# Patient Record
Sex: MALE | Race: WHITE | NOT HISPANIC OR LATINO | Employment: OTHER | URBAN - METROPOLITAN AREA
[De-identification: names, ages, dates, MRNs, and addresses within clinical notes are randomized per-mention and may not be internally consistent; named-entity substitution may affect disease eponyms.]

---

## 2017-02-24 ENCOUNTER — HOSPITAL ENCOUNTER (OUTPATIENT)
Dept: LAB | Facility: MEDICAL CENTER | Age: 77
End: 2017-02-24
Attending: INTERNAL MEDICINE
Payer: MEDICARE

## 2017-02-24 LAB
ALBUMIN SERPL BCP-MCNC: 3.7 G/DL (ref 3.2–4.9)
ALBUMIN/GLOB SERPL: 1.5 G/DL
ALP SERPL-CCNC: 84 U/L (ref 30–99)
ALT SERPL-CCNC: 22 U/L (ref 2–50)
ANION GAP SERPL CALC-SCNC: 10 MMOL/L (ref 0–11.9)
AST SERPL-CCNC: 18 U/L (ref 12–45)
BILIRUB SERPL-MCNC: 1 MG/DL (ref 0.1–1.5)
BUN SERPL-MCNC: 23 MG/DL (ref 8–22)
CALCIUM SERPL-MCNC: 8.6 MG/DL (ref 8.5–10.5)
CHLORIDE SERPL-SCNC: 106 MMOL/L (ref 96–112)
CO2 SERPL-SCNC: 24 MMOL/L (ref 20–33)
CREAT SERPL-MCNC: 1.2 MG/DL (ref 0.5–1.4)
GLOBULIN SER CALC-MCNC: 2.5 G/DL (ref 1.9–3.5)
GLUCOSE SERPL-MCNC: 135 MG/DL (ref 65–99)
POTASSIUM SERPL-SCNC: 3.5 MMOL/L (ref 3.6–5.5)
PROT SERPL-MCNC: 6.2 G/DL (ref 6–8.2)
SODIUM SERPL-SCNC: 140 MMOL/L (ref 135–145)

## 2017-02-24 PROCEDURE — 36415 COLL VENOUS BLD VENIPUNCTURE: CPT

## 2017-02-24 PROCEDURE — 80053 COMPREHEN METABOLIC PANEL: CPT

## 2017-03-02 ENCOUNTER — HOSPITAL ENCOUNTER (OUTPATIENT)
Dept: RADIOLOGY | Facility: MEDICAL CENTER | Age: 77
End: 2017-03-02
Attending: INTERNAL MEDICINE
Payer: MEDICARE

## 2017-03-02 DIAGNOSIS — C34.32 PRIMARY MALIGNANT NEOPLASM OF BRONCHUS OF LEFT LOWER LOBE (HCC): ICD-10-CM

## 2017-03-02 PROCEDURE — 71260 CT THORAX DX C+: CPT

## 2017-03-02 PROCEDURE — 700117 HCHG RX CONTRAST REV CODE 255: Performed by: INTERNAL MEDICINE

## 2017-03-02 RX ADMIN — IOHEXOL 100 ML: 350 INJECTION, SOLUTION INTRAVENOUS at 11:15

## 2017-03-23 ENCOUNTER — HOSPITAL ENCOUNTER (OUTPATIENT)
Dept: RADIOLOGY | Facility: MEDICAL CENTER | Age: 77
End: 2017-03-23
Attending: RADIOLOGY
Payer: MEDICARE

## 2017-03-23 ENCOUNTER — HOSPITAL ENCOUNTER (OUTPATIENT)
Dept: RADIATION ONCOLOGY | Facility: MEDICAL CENTER | Age: 77
End: 2017-03-31
Attending: RADIOLOGY
Payer: MEDICARE

## 2017-03-23 VITALS
BODY MASS INDEX: 39.24 KG/M2 | TEMPERATURE: 96.8 F | WEIGHT: 243 LBS | SYSTOLIC BLOOD PRESSURE: 124 MMHG | DIASTOLIC BLOOD PRESSURE: 62 MMHG | HEART RATE: 109 BPM | OXYGEN SATURATION: 92 %

## 2017-03-23 DIAGNOSIS — C79.31 BRAIN METASTASIS: ICD-10-CM

## 2017-03-23 PROCEDURE — 99212 OFFICE O/P EST SF 10 MIN: CPT | Performed by: RADIOLOGY

## 2017-03-23 PROCEDURE — 99214 OFFICE O/P EST MOD 30 MIN: CPT | Performed by: RADIOLOGY

## 2017-03-23 PROCEDURE — A9577 INJ MULTIHANCE: HCPCS | Performed by: RADIOLOGY

## 2017-03-23 PROCEDURE — 700117 HCHG RX CONTRAST REV CODE 255: Performed by: RADIOLOGY

## 2017-03-23 PROCEDURE — 70553 MRI BRAIN STEM W/O & W/DYE: CPT

## 2017-03-23 RX ORDER — DEXAMETHASONE 2 MG/1
2 TABLET ORAL
Qty: 100 TAB | Refills: 0 | Status: SHIPPED | OUTPATIENT
Start: 2017-03-23 | End: 2017-04-02

## 2017-03-23 RX ADMIN — GADOBENATE DIMEGLUMINE 10 ML: 529 INJECTION, SOLUTION INTRAVENOUS at 12:15
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
## 2017-03-30 ENCOUNTER — HOSPITAL ENCOUNTER (OUTPATIENT)
Dept: RADIATION ONCOLOGY | Facility: MEDICAL CENTER | Age: 77
End: 2017-03-30

## 2017-03-30 PROCEDURE — 77334 RADIATION TREATMENT AID(S): CPT | Mod: 26 | Performed by: RADIOLOGY

## 2017-03-30 PROCEDURE — 77290 THER RAD SIMULAJ FIELD CPLX: CPT | Performed by: RADIOLOGY

## 2017-03-30 PROCEDURE — 77334 RADIATION TREATMENT AID(S): CPT | Performed by: RADIOLOGY

## 2017-03-30 PROCEDURE — 77263 THER RADIOLOGY TX PLNG CPLX: CPT | Performed by: RADIOLOGY

## 2017-03-30 PROCEDURE — 77470 SPECIAL RADIATION TREATMENT: CPT | Performed by: RADIOLOGY

## 2017-03-30 PROCEDURE — 77470 SPECIAL RADIATION TREATMENT: CPT | Mod: 26 | Performed by: RADIOLOGY

## 2017-04-11 ENCOUNTER — TELEPHONE (OUTPATIENT)
Dept: RADIATION ONCOLOGY | Facility: MEDICAL CENTER | Age: 77
End: 2017-04-11

## 2017-04-11 DIAGNOSIS — C79.31 BRAIN METASTASIS: ICD-10-CM

## 2017-04-11 RX ORDER — MEMANTINE HYDROCHLORIDE 10 MG/1
10 TABLET ORAL 2 TIMES DAILY
Qty: 60 TAB | Refills: 5 | Status: SHIPPED | OUTPATIENT
Start: 2017-04-11

## 2017-04-11 NOTE — TELEPHONE ENCOUNTER
Radiation Oncology:    77-year-old with stage IV lung cancer. Previously treated with stereotactic radiosurgery to several brain sites. Now has additional brain metastasis and has been recommended whole brain radiotherapy. Initially, we were attempting to enroll him in a clinical trial evaluating the use of Namenda and hippocampal sparing radiotherapy.    Unfortunately because of patient's prior radiosurgical treatments he would not be a candidate for clinical trial. However, since he will require IMRT based radiotherapy to treat his brain we can proceed on with hippocampal sparing off trial as well as the use of Namenda which is previously been established to help minimize decrease in cognitive function associated with whole brain radiotherapy.    Patient's currently on weekly Navelbine, and received a dose on April 10. We'll plan on starting radiotherapy on April 17 with completion on April 28. Discussed with Dr. Gomez holding now will be on the 17th and 24th with resumption on May 1. Patient will also start Namenda today. We'll continue during radiation and for 6 months post radiation.    Justus KWOK M.D.  Electronically signed by: Justus Rucker V, 4/11/2017 10:18 AM  [phone]

## 2017-04-13 PROCEDURE — 77338 DESIGN MLC DEVICE FOR IMRT: CPT | Mod: 26 | Performed by: RADIOLOGY

## 2017-04-13 PROCEDURE — 77300 RADIATION THERAPY DOSE PLAN: CPT | Performed by: RADIOLOGY

## 2017-04-13 PROCEDURE — 77300 RADIATION THERAPY DOSE PLAN: CPT | Mod: 26 | Performed by: RADIOLOGY

## 2017-04-13 PROCEDURE — 77301 RADIOTHERAPY DOSE PLAN IMRT: CPT | Performed by: RADIOLOGY

## 2017-04-13 PROCEDURE — 77338 DESIGN MLC DEVICE FOR IMRT: CPT | Performed by: RADIOLOGY

## 2017-04-13 PROCEDURE — 77301 RADIOTHERAPY DOSE PLAN IMRT: CPT | Mod: 26 | Performed by: RADIOLOGY

## 2017-04-14 PROCEDURE — 77370 RADIATION PHYSICS CONSULT: CPT | Performed by: RADIOLOGY

## 2017-04-17 ENCOUNTER — HOSPITAL ENCOUNTER (OUTPATIENT)
Dept: RADIATION ONCOLOGY | Facility: MEDICAL CENTER | Age: 77
End: 2017-04-17

## 2017-04-17 ENCOUNTER — HOSPITAL ENCOUNTER (OUTPATIENT)
Dept: RADIATION ONCOLOGY | Facility: MEDICAL CENTER | Age: 77
End: 2017-04-30
Attending: RADIOLOGY
Payer: MEDICARE

## 2017-04-17 PROCEDURE — 77280 THER RAD SIMULAJ FIELD SMPL: CPT | Performed by: RADIOLOGY

## 2017-04-17 PROCEDURE — 77386 HCHG IMRT DELIVERY COMPLEX: CPT | Performed by: RADIOLOGY

## 2017-04-17 PROCEDURE — 77280 THER RAD SIMULAJ FIELD SMPL: CPT | Mod: 26 | Performed by: RADIOLOGY

## 2017-04-18 PROCEDURE — 77386 HCHG IMRT DELIVERY COMPLEX: CPT | Performed by: RADIOLOGY

## 2017-04-18 PROCEDURE — 77014 PR CT GUIDANCE PLACEMENT RAD THERAPY FIELDS: CPT | Mod: 26 | Performed by: RADIOLOGY

## 2017-04-19 PROCEDURE — 77386 HCHG IMRT DELIVERY COMPLEX: CPT | Performed by: RADIOLOGY

## 2017-04-19 PROCEDURE — 77014 PR CT GUIDANCE PLACEMENT RAD THERAPY FIELDS: CPT | Mod: 26 | Performed by: RADIOLOGY

## 2017-04-19 PROCEDURE — 77336 RADIATION PHYSICS CONSULT: CPT | Performed by: RADIOLOGY

## 2017-04-20 PROCEDURE — 77014 PR CT GUIDANCE PLACEMENT RAD THERAPY FIELDS: CPT | Mod: 26 | Performed by: RADIOLOGY

## 2017-04-20 PROCEDURE — 77386 HCHG IMRT DELIVERY COMPLEX: CPT | Performed by: RADIOLOGY

## 2017-04-21 PROCEDURE — 77386 HCHG IMRT DELIVERY COMPLEX: CPT | Performed by: RADIOLOGY

## 2017-04-21 PROCEDURE — 77427 RADIATION TX MANAGEMENT X5: CPT | Performed by: RADIOLOGY

## 2017-04-21 PROCEDURE — 77014 PR CT GUIDANCE PLACEMENT RAD THERAPY FIELDS: CPT | Mod: 26 | Performed by: RADIOLOGY

## 2017-04-24 PROCEDURE — 77386 HCHG IMRT DELIVERY COMPLEX: CPT | Performed by: RADIOLOGY

## 2017-04-24 PROCEDURE — 77014 PR CT GUIDANCE PLACEMENT RAD THERAPY FIELDS: CPT | Mod: 26 | Performed by: RADIOLOGY

## 2017-04-25 PROCEDURE — 77386 HCHG IMRT DELIVERY COMPLEX: CPT | Performed by: RADIOLOGY

## 2017-04-25 PROCEDURE — 77014 PR CT GUIDANCE PLACEMENT RAD THERAPY FIELDS: CPT | Mod: 26 | Performed by: RADIOLOGY

## 2017-04-26 ENCOUNTER — HOSPITAL ENCOUNTER (OUTPATIENT)
Dept: RADIOLOGY | Facility: MEDICAL CENTER | Age: 77
End: 2017-04-26
Attending: INTERNAL MEDICINE
Payer: MEDICARE

## 2017-04-26 DIAGNOSIS — C34.32 PRIMARY MALIGNANT NEOPLASM OF BRONCHUS OF LEFT LOWER LOBE (HCC): ICD-10-CM

## 2017-04-26 DIAGNOSIS — C79.31 BRAIN METASTASIS: ICD-10-CM

## 2017-04-26 DIAGNOSIS — C79.31 SECONDARY MALIGNANT NEOPLASM OF BRAIN AND SPINAL CORD (HCC): ICD-10-CM

## 2017-04-26 DIAGNOSIS — C79.49 SECONDARY MALIGNANT NEOPLASM OF BRAIN AND SPINAL CORD (HCC): ICD-10-CM

## 2017-04-26 PROCEDURE — 77386 HCHG IMRT DELIVERY COMPLEX: CPT | Performed by: RADIOLOGY

## 2017-04-26 PROCEDURE — 77336 RADIATION PHYSICS CONSULT: CPT | Performed by: RADIOLOGY

## 2017-04-26 PROCEDURE — 71260 CT THORAX DX C+: CPT

## 2017-04-26 PROCEDURE — 77014 PR CT GUIDANCE PLACEMENT RAD THERAPY FIELDS: CPT | Mod: 26 | Performed by: RADIOLOGY

## 2017-04-26 PROCEDURE — 700117 HCHG RX CONTRAST REV CODE 255: Performed by: INTERNAL MEDICINE

## 2017-04-26 RX ADMIN — IOHEXOL 100 ML: 350 INJECTION, SOLUTION INTRAVENOUS at 15:00

## 2017-04-27 PROCEDURE — 77386 HCHG IMRT DELIVERY COMPLEX: CPT | Performed by: RADIOLOGY

## 2017-04-27 PROCEDURE — 77014 PR CT GUIDANCE PLACEMENT RAD THERAPY FIELDS: CPT | Mod: 26 | Performed by: RADIOLOGY

## 2017-04-28 PROCEDURE — 77386 HCHG IMRT DELIVERY COMPLEX: CPT | Performed by: RADIOLOGY

## 2017-04-28 PROCEDURE — 77427 RADIATION TX MANAGEMENT X5: CPT | Performed by: RADIOLOGY

## 2017-04-28 PROCEDURE — 77014 PR CT GUIDANCE PLACEMENT RAD THERAPY FIELDS: CPT | Mod: 26 | Performed by: RADIOLOGY

## 2017-05-04 ENCOUNTER — APPOINTMENT (OUTPATIENT)
Dept: RADIOLOGY | Facility: MEDICAL CENTER | Age: 77
DRG: 205 | End: 2017-05-04
Attending: EMERGENCY MEDICINE
Payer: MEDICARE

## 2017-05-04 ENCOUNTER — HOSPITAL ENCOUNTER (INPATIENT)
Facility: MEDICAL CENTER | Age: 77
LOS: 5 days | DRG: 205 | End: 2017-05-09
Attending: EMERGENCY MEDICINE | Admitting: INTERNAL MEDICINE
Payer: MEDICARE

## 2017-05-04 ENCOUNTER — RESOLUTE PROFESSIONAL BILLING HOSPITAL PROF FEE (OUTPATIENT)
Dept: HOSPITALIST | Facility: MEDICAL CENTER | Age: 77
End: 2017-05-04
Payer: MEDICARE

## 2017-05-04 DIAGNOSIS — J96.01 ACUTE RESPIRATORY FAILURE WITH HYPOXIA (HCC): ICD-10-CM

## 2017-05-04 PROBLEM — D64.9 NORMOCYTIC ANEMIA: Status: ACTIVE | Noted: 2017-05-04

## 2017-05-04 PROBLEM — D69.6 THROMBOCYTOPENIA (HCC): Status: ACTIVE | Noted: 2017-05-04

## 2017-05-04 PROBLEM — R73.9 HYPERGLYCEMIA: Status: ACTIVE | Noted: 2017-05-04

## 2017-05-04 PROBLEM — R79.89 ELEVATED D-DIMER: Status: ACTIVE | Noted: 2017-05-04

## 2017-05-04 PROBLEM — E87.20 LACTIC ACIDOSIS: Status: ACTIVE | Noted: 2017-05-04

## 2017-05-04 LAB
ALBUMIN SERPL BCP-MCNC: 3.5 G/DL (ref 3.2–4.9)
ALBUMIN/GLOB SERPL: 1.4 G/DL
ALP SERPL-CCNC: 76 U/L (ref 30–99)
ALT SERPL-CCNC: 37 U/L (ref 2–50)
ANION GAP SERPL CALC-SCNC: 10 MMOL/L (ref 0–11.9)
ANISOCYTOSIS BLD QL SMEAR: ABNORMAL
APPEARANCE UR: CLEAR
AST SERPL-CCNC: 17 U/L (ref 12–45)
BASOPHILS # BLD AUTO: 0 % (ref 0–1.8)
BASOPHILS # BLD: 0 K/UL (ref 0–0.12)
BILIRUB SERPL-MCNC: 1.1 MG/DL (ref 0.1–1.5)
BILIRUB UR QL STRIP.AUTO: NEGATIVE
BNP SERPL-MCNC: 33 PG/ML (ref 0–100)
BUN SERPL-MCNC: 30 MG/DL (ref 8–22)
CALCIUM SERPL-MCNC: 8.8 MG/DL (ref 8.5–10.5)
CHLORIDE SERPL-SCNC: 103 MMOL/L (ref 96–112)
CO2 SERPL-SCNC: 22 MMOL/L (ref 20–33)
COLOR UR: YELLOW
CREAT SERPL-MCNC: 0.86 MG/DL (ref 0.5–1.4)
DEPRECATED D DIMER PPP IA-ACNC: 348 NG/ML(D-DU)
EOSINOPHIL # BLD AUTO: 0 K/UL (ref 0–0.51)
EOSINOPHIL NFR BLD: 0 % (ref 0–6.9)
ERYTHROCYTE [DISTWIDTH] IN BLOOD BY AUTOMATED COUNT: 71.7 FL (ref 35.9–50)
GFR SERPL CREATININE-BSD FRML MDRD: >60 ML/MIN/1.73 M 2
GLOBULIN SER CALC-MCNC: 2.5 G/DL (ref 1.9–3.5)
GLUCOSE BLD-MCNC: 116 MG/DL (ref 65–99)
GLUCOSE SERPL-MCNC: 198 MG/DL (ref 65–99)
GLUCOSE UR STRIP.AUTO-MCNC: NEGATIVE MG/DL
HCT VFR BLD AUTO: 41.3 % (ref 42–52)
HGB BLD-MCNC: 13.6 G/DL (ref 14–18)
KETONES UR STRIP.AUTO-MCNC: NEGATIVE MG/DL
LACTATE BLD-SCNC: 1.4 MMOL/L (ref 0.5–2)
LACTATE BLD-SCNC: 1.8 MMOL/L (ref 0.5–2)
LACTATE BLD-SCNC: 2.5 MMOL/L (ref 0.5–2)
LEUKOCYTE ESTERASE UR QL STRIP.AUTO: NEGATIVE
LYMPHOCYTES # BLD AUTO: 0.31 K/UL (ref 1–4.8)
LYMPHOCYTES NFR BLD: 4.4 % (ref 22–41)
MANUAL DIFF BLD: NORMAL
MCH RBC QN AUTO: 30.6 PG (ref 27–33)
MCHC RBC AUTO-ENTMCNC: 32.9 G/DL (ref 33.7–35.3)
MCV RBC AUTO: 92.8 FL (ref 81.4–97.8)
MICRO URNS: NORMAL
MICROCYTES BLD QL SMEAR: ABNORMAL
MONOCYTES # BLD AUTO: 0 K/UL (ref 0–0.85)
MONOCYTES NFR BLD AUTO: 0 % (ref 0–13.4)
MORPHOLOGY BLD-IMP: NORMAL
NEUTROPHILS # BLD AUTO: 6.69 K/UL (ref 1.82–7.42)
NEUTROPHILS NFR BLD: 95.6 % (ref 44–72)
NITRITE UR QL STRIP.AUTO: NEGATIVE
NRBC # BLD AUTO: 0.02 K/UL
NRBC BLD AUTO-RTO: 0.3 /100 WBC
PH UR STRIP.AUTO: 6 [PH]
PLATELET # BLD AUTO: 123 K/UL (ref 164–446)
PLATELET BLD QL SMEAR: NORMAL
PMV BLD AUTO: 11.2 FL (ref 9–12.9)
POIKILOCYTOSIS BLD QL SMEAR: NORMAL
POTASSIUM SERPL-SCNC: 4.1 MMOL/L (ref 3.6–5.5)
PROT SERPL-MCNC: 6 G/DL (ref 6–8.2)
PROT UR QL STRIP: NEGATIVE MG/DL
RBC # BLD AUTO: 4.45 M/UL (ref 4.7–6.1)
RBC BLD AUTO: PRESENT
RBC UR QL AUTO: NEGATIVE
SODIUM SERPL-SCNC: 135 MMOL/L (ref 135–145)
SP GR UR STRIP.AUTO: 1.02
WBC # BLD AUTO: 7 K/UL (ref 4.8–10.8)

## 2017-05-04 PROCEDURE — 700102 HCHG RX REV CODE 250 W/ 637 OVERRIDE(OP): Performed by: INTERNAL MEDICINE

## 2017-05-04 PROCEDURE — 81003 URINALYSIS AUTO W/O SCOPE: CPT

## 2017-05-04 PROCEDURE — 93005 ELECTROCARDIOGRAM TRACING: CPT | Performed by: EMERGENCY MEDICINE

## 2017-05-04 PROCEDURE — 700105 HCHG RX REV CODE 258: Performed by: EMERGENCY MEDICINE

## 2017-05-04 PROCEDURE — 306580 SET INFUSION,POWERLOC 20G X.75

## 2017-05-04 PROCEDURE — 700111 HCHG RX REV CODE 636 W/ 250 OVERRIDE (IP): Performed by: INTERNAL MEDICINE

## 2017-05-04 PROCEDURE — 87086 URINE CULTURE/COLONY COUNT: CPT

## 2017-05-04 PROCEDURE — 85027 COMPLETE CBC AUTOMATED: CPT

## 2017-05-04 PROCEDURE — 700101 HCHG RX REV CODE 250: Performed by: EMERGENCY MEDICINE

## 2017-05-04 PROCEDURE — 770020 HCHG ROOM/CARE - TELE (206)

## 2017-05-04 PROCEDURE — 70450 CT HEAD/BRAIN W/O DYE: CPT

## 2017-05-04 PROCEDURE — 94640 AIRWAY INHALATION TREATMENT: CPT

## 2017-05-04 PROCEDURE — 71275 CT ANGIOGRAPHY CHEST: CPT

## 2017-05-04 PROCEDURE — 87040 BLOOD CULTURE FOR BACTERIA: CPT

## 2017-05-04 PROCEDURE — 80053 COMPREHEN METABOLIC PANEL: CPT

## 2017-05-04 PROCEDURE — 83605 ASSAY OF LACTIC ACID: CPT | Mod: 91

## 2017-05-04 PROCEDURE — 85007 BL SMEAR W/DIFF WBC COUNT: CPT

## 2017-05-04 PROCEDURE — 83036 HEMOGLOBIN GLYCOSYLATED A1C: CPT

## 2017-05-04 PROCEDURE — 82962 GLUCOSE BLOOD TEST: CPT

## 2017-05-04 PROCEDURE — 83880 ASSAY OF NATRIURETIC PEPTIDE: CPT

## 2017-05-04 PROCEDURE — 85379 FIBRIN DEGRADATION QUANT: CPT

## 2017-05-04 PROCEDURE — A9270 NON-COVERED ITEM OR SERVICE: HCPCS | Performed by: INTERNAL MEDICINE

## 2017-05-04 PROCEDURE — 99223 1ST HOSP IP/OBS HIGH 75: CPT | Mod: AI | Performed by: INTERNAL MEDICINE

## 2017-05-04 PROCEDURE — 700105 HCHG RX REV CODE 258: Performed by: INTERNAL MEDICINE

## 2017-05-04 PROCEDURE — 36415 COLL VENOUS BLD VENIPUNCTURE: CPT

## 2017-05-04 PROCEDURE — 99285 EMERGENCY DEPT VISIT HI MDM: CPT

## 2017-05-04 PROCEDURE — 700117 HCHG RX CONTRAST REV CODE 255: Performed by: EMERGENCY MEDICINE

## 2017-05-04 PROCEDURE — 96360 HYDRATION IV INFUSION INIT: CPT

## 2017-05-04 PROCEDURE — 71010 DX-CHEST-PORTABLE (1 VIEW): CPT

## 2017-05-04 RX ORDER — DEXAMETHASONE 1 MG
2 TABLET ORAL DAILY
Status: DISCONTINUED | OUTPATIENT
Start: 2017-05-04 | End: 2017-05-09 | Stop reason: HOSPADM

## 2017-05-04 RX ORDER — GUAIFENESIN/DEXTROMETHORPHAN 100-10MG/5
10 SYRUP ORAL EVERY 6 HOURS PRN
Status: DISCONTINUED | OUTPATIENT
Start: 2017-05-04 | End: 2017-05-09 | Stop reason: HOSPADM

## 2017-05-04 RX ORDER — DEXAMETHASONE 2 MG/1
2 TABLET ORAL DAILY
COMMUNITY
End: 2017-06-20

## 2017-05-04 RX ORDER — SIMVASTATIN 20 MG
20 TABLET ORAL NIGHTLY
Status: DISCONTINUED | OUTPATIENT
Start: 2017-05-04 | End: 2017-05-09 | Stop reason: HOSPADM

## 2017-05-04 RX ORDER — ONDANSETRON 4 MG/1
4 TABLET, ORALLY DISINTEGRATING ORAL EVERY 4 HOURS PRN
Status: DISCONTINUED | OUTPATIENT
Start: 2017-05-04 | End: 2017-05-09 | Stop reason: HOSPADM

## 2017-05-04 RX ORDER — SODIUM CHLORIDE 9 MG/ML
INJECTION, SOLUTION INTRAVENOUS CONTINUOUS
Status: DISCONTINUED | OUTPATIENT
Start: 2017-05-04 | End: 2017-05-09 | Stop reason: HOSPADM

## 2017-05-04 RX ORDER — ONDANSETRON 2 MG/ML
4 INJECTION INTRAMUSCULAR; INTRAVENOUS EVERY 4 HOURS PRN
Status: DISCONTINUED | OUTPATIENT
Start: 2017-05-04 | End: 2017-05-09 | Stop reason: HOSPADM

## 2017-05-04 RX ORDER — POLYETHYLENE GLYCOL 3350 17 G/17G
1 POWDER, FOR SOLUTION ORAL
Status: DISCONTINUED | OUTPATIENT
Start: 2017-05-04 | End: 2017-05-09 | Stop reason: HOSPADM

## 2017-05-04 RX ORDER — DEXTROSE MONOHYDRATE 25 G/50ML
25 INJECTION, SOLUTION INTRAVENOUS
Status: DISCONTINUED | OUTPATIENT
Start: 2017-05-04 | End: 2017-05-09 | Stop reason: HOSPADM

## 2017-05-04 RX ORDER — AMOXICILLIN 250 MG
2 CAPSULE ORAL 2 TIMES DAILY
Status: DISCONTINUED | OUTPATIENT
Start: 2017-05-05 | End: 2017-05-09 | Stop reason: HOSPADM

## 2017-05-04 RX ORDER — METOPROLOL TARTRATE 50 MG/1
50 TABLET, FILM COATED ORAL 2 TIMES DAILY
Status: DISCONTINUED | OUTPATIENT
Start: 2017-05-04 | End: 2017-05-09 | Stop reason: HOSPADM

## 2017-05-04 RX ORDER — LEVOTHYROXINE SODIUM 0.03 MG/1
25 TABLET ORAL
Status: DISCONTINUED | OUTPATIENT
Start: 2017-05-05 | End: 2017-05-09 | Stop reason: HOSPADM

## 2017-05-04 RX ORDER — MEMANTINE HYDROCHLORIDE 10 MG/1
10 TABLET ORAL 2 TIMES DAILY
Status: DISCONTINUED | OUTPATIENT
Start: 2017-05-04 | End: 2017-05-09 | Stop reason: HOSPADM

## 2017-05-04 RX ORDER — ALBUTEROL SULFATE 90 UG/1
2 AEROSOL, METERED RESPIRATORY (INHALATION)
Status: DISCONTINUED | OUTPATIENT
Start: 2017-05-04 | End: 2017-05-08

## 2017-05-04 RX ORDER — BISACODYL 10 MG
10 SUPPOSITORY, RECTAL RECTAL
Status: DISCONTINUED | OUTPATIENT
Start: 2017-05-04 | End: 2017-05-09 | Stop reason: HOSPADM

## 2017-05-04 RX ORDER — SODIUM CHLORIDE 9 MG/ML
1000 INJECTION, SOLUTION INTRAVENOUS ONCE
Status: COMPLETED | OUTPATIENT
Start: 2017-05-04 | End: 2017-05-04

## 2017-05-04 RX ADMIN — ALBUTEROL SULFATE 2.5 MG: 2.5 SOLUTION RESPIRATORY (INHALATION) at 19:31

## 2017-05-04 RX ADMIN — METOPROLOL TARTRATE 50 MG: 50 TABLET, FILM COATED ORAL at 22:21

## 2017-05-04 RX ADMIN — SODIUM CHLORIDE: 9 INJECTION, SOLUTION INTRAVENOUS at 22:16

## 2017-05-04 RX ADMIN — MEMANTINE HYDROCHLORIDE 10 MG: 10 TABLET ORAL at 22:21

## 2017-05-04 RX ADMIN — SIMVASTATIN 20 MG: 20 TABLET, FILM COATED ORAL at 22:21

## 2017-05-04 RX ADMIN — IPRATROPIUM BROMIDE 0.5 MG: 0.5 SOLUTION RESPIRATORY (INHALATION) at 19:31

## 2017-05-04 RX ADMIN — IOHEXOL 75 ML: 350 INJECTION, SOLUTION INTRAVENOUS at 20:44

## 2017-05-04 RX ADMIN — ENOXAPARIN SODIUM 40 MG: 100 INJECTION SUBCUTANEOUS at 22:22

## 2017-05-04 RX ADMIN — SODIUM CHLORIDE 1000 ML: 9 INJECTION, SOLUTION INTRAVENOUS at 16:29

## 2017-05-04 RX ADMIN — ALBUTEROL SULFATE 2 PUFF: 90 AEROSOL, METERED RESPIRATORY (INHALATION) at 23:58

## 2017-05-04 ASSESSMENT — LIFESTYLE VARIABLES
ALCOHOL_USE: NO
EVER_SMOKED: YES
EVER_SMOKED: YES

## 2017-05-04 ASSESSMENT — PATIENT HEALTH QUESTIONNAIRE - PHQ9
SUM OF ALL RESPONSES TO PHQ QUESTIONS 1-9: 0
2. FEELING DOWN, DEPRESSED, IRRITABLE, OR HOPELESS: NOT AT ALL
SUM OF ALL RESPONSES TO PHQ9 QUESTIONS 1 AND 2: 0
1. LITTLE INTEREST OR PLEASURE IN DOING THINGS: NOT AT ALL

## 2017-05-04 ASSESSMENT — COPD QUESTIONNAIRES
DURING THE PAST 4 WEEKS HOW MUCH DID YOU FEEL SHORT OF BREATH: SOME OF THE TIME
HAVE YOU SMOKED AT LEAST 100 CIGARETTES IN YOUR ENTIRE LIFE: YES
COPD SCREENING SCORE: 6
DO YOU EVER COUGH UP ANY MUCUS OR PHLEGM?: NO/ONLY WITH OCCASIONAL COLDS OR INFECTIONS

## 2017-05-04 ASSESSMENT — PAIN SCALES - GENERAL
PAINLEVEL_OUTOF10: 0
PAINLEVEL_OUTOF10: 0

## 2017-05-04 NOTE — IP AVS SNAPSHOT
5/9/2017    Sonny Carter  [address] [place] [address]    Dear Sonny:    [facility] wants to ensure your discharge home is safe and you or your loved ones have had all of your questions answered regarding your care after you leave the hospital.    Below is a list of resources and contact information should you have any questions regarding your hospital stay, follow-up instructions, or active medical symptoms.    Questions or Concerns Regarding… Contact   Medical Questions Related to Your Discharge  (7 days a week, 8am-5pm) Contact a Nurse Care Coordinator   [phone]   Medical Questions Not Related to Your Discharge  (24 hours a day / 7 days a week)  Contact the Nurse Health Line   [phone]    Medications or Discharge Instructions Refer to your discharge packet   or contact your [facility] Primary Care Provider   [phone]   Follow-up Appointment(s) Schedule your appointment via [employer]   or contact Scheduling [phone]   Billing Review your statement via [employer]  or contact Billing [phone]   Medical Records Review your records via Sitefly   or contact Medical Records [phone]     You may receive a telephone call within two days of discharge. This call is to make certain you understand your discharge instructions and have the opportunity to have any questions answered. You can also easily access your medical information, test results and upcoming appointments via the Sitefly free online health management tool. You can learn more and sign up at [employer]/Sitefly. For assistance setting up your Sitefly account, please call [phone].    Once again, we want to ensure your discharge home is safe and that you have a clear understanding of any next steps in your care. If you have any questions or concerns, please do not hesitate to contact us, we are here for you. Thank you for choosing [facility] for your healthcare needs.    Sincerely,    Your [facility] Healthcare Team

## 2017-05-04 NOTE — IP AVS SNAPSHOT
[employer] Access Code: [email or web address]  Expires: 6/8/2017  3:05 PM    [employer]  A secure, online tool to manage your health information     [facility]’s [employer]® is a secure, online tool that connects you to your personalized health information from the privacy of your home -- day or night - making it very easy for you to manage your healthcare. Once the activation process is completed, you can even access your medical information using the [employer] dusty, which is available for free in the Apple Dusty store or Google Play store.     [employer] provides the following levels of access (as shown below):   My Chart Features   [facility] Primary Care Doctor [facility]  Specialists [facility]  Urgent  Care Non-[facility]  Primary Care  Doctor   Email your healthcare team securely and privately 24/7 X X X X   Manage appointments: schedule your next appointment; view details of past/upcoming appointments X      Request prescription refills. X      View recent personal medical records, including lab and immunizations X X X X   View health record, including health history, allergies, medications X X X X   Read reports about your outpatient visits, procedures, consult and ER notes X X X X   See your discharge summary, which is a recap of your hospital and/or ER visit that includes your diagnosis, lab results, and care plan. X X       How to register for [employer]:  1. Go to  [email or web address].  2. Click on the Sign Up Now box, which takes you to the New Member Sign Up page. You will need to provide the following information:  a. Enter your [employer] Access Code exactly as it appears at the top of this page. (You will not need to use this code after you’ve completed the sign-up process. If you do not sign up before the expiration date, you must request a new code.)   b. Enter your date of birth.   c. Enter your home email address.   d. Click Submit, and follow the next screen’s instructions.  3. Create a [employer] ID. This will be your [employer]  login ID and cannot be changed, so think of one that is secure and easy to remember.  4. Create a [employer] password. You can change your password at any time.  5. Enter your Password Reset Question and Answer. This can be used at a later time if you forget your password.   6. Enter your e-mail address. This allows you to receive e-mail notifications when new information is available in [employer].  7. Click Sign Up. You can now view your health information.    For assistance activating your [employer] account, call [phone]

## 2017-05-04 NOTE — ED NOTES
Chief Complaint   Patient presents with   • Tired     Pt reports increasing fatigue over the past week.  Pt has history of lung cancer with metastasis to the brain and liver.  Pt has been getting radiation and chemotherapy.  Pt's oncologist is MD Gomez.    Pt bib [facility] for above chief complaint.  Sepsis score 3.

## 2017-05-04 NOTE — ED NOTES
The Medication Reconciliation process has been completed by interviewing the patient who had chemo last on Monday at Dr. Gomez's office.    Allergies have been reviewed  Antibiotic use in 30 days - none    Home Pharmacy:  [facility] - [place]

## 2017-05-04 NOTE — ED NOTES
Pt would like port accessed, no kit available in ER, kit ordered.  Phlebotomist called for lab draw.

## 2017-05-04 NOTE — ED NOTES
Chief Complaint   Patient presents with   • Tired     Pt reports increasing fatigue over the past week.  Pt has history of lung cancer with metastasis to the brain and liver.  Pt has been getting radiation and chemotherapy.  Pt's oncologist is MD Gomez.    Pt bib [facility] for above chief complaint.  SpO2 86% on RA.  Sepsis score 3.

## 2017-05-04 NOTE — IP AVS SNAPSHOT
" Home Care Instructions                                                                                                                  Name:Sonny Carter  Medical Record Number:[id number]  CSN: [id number]    YOB: 1940   Age: 77 y.o.  Sex: male  HT:1.727 m (5' 8\") WT: 109.4 kg (241 lb 2.9 oz)          Admit Date: 5/4/2017     Discharge Date:   Today's Date: 5/9/2017  Attending Doctor:  Jesus Cano M.D.                  Allergies:  Review of patient's allergies indicates no known allergies.            Discharge Instructions       Discharge Instructions    Discharged to other by [facility] with escort. Discharged via wheelchair, hospital escort: Yes.  Special equipment needed: Cane, Oxygen and Wheelchair    Be sure to schedule a follow-up appointment with your primary care doctor or any specialists as instructed.     Discharge Plan:   Influenza Vaccine Indication: Patient Refuses    I understand that a diet low in cholesterol, fat, and sodium is recommended for good health. Unless I have been given specific instructions below for another diet, I accept this instruction as my diet prescription.   Other diet: Cardiac    Special Instructions: None    · Is patient discharged on Warfarin / Coumadin?   No     · Is patient Post Blood Transfusion?  No    Depression / Suicide Risk    As you are discharged from this [facility] facility, it is important to learn how to keep safe from harming yourself.    Recognize the warning signs:  · Abrupt changes in personality, positive or negative- including increase in energy   · Giving away possessions  · Change in eating patterns- significant weight changes-  positive or negative  · Change in sleeping patterns- unable to sleep or sleeping all the time   · Unwillingness or inability to communicate  · Depression  · Unusual sadness, discouragement and loneliness  · Talk of wanting to die  · Neglect of personal appearance   · Rebelliousness- reckless " behavior  · Withdrawal from people/activities they love  · Confusion- inability to concentrate     If you or a loved one observes any of these behaviors or has concerns about self-harm, here's what you can do:  · Talk about it- your feelings and reasons for harming yourself  · Remove any means that you might use to hurt yourself (examples: pills, rope, extension cords, firearm)  · Get professional help from the community (Mental Health, Substance Abuse, psychological counseling)  · Do not be alone:Call your Safe Contact- someone whom you trust who will be there for you.  · Call your local CRISIS HOTLINE 298-8447 or 922-475-4917  · Call your local Children's Mobile Crisis Response Team [place] (068) 561-3783 or www.Quantum  · Call the toll free National Suicide Prevention Hotlines   · National Suicide Prevention Lifeline 131-637-TVOM (7165)  · [facility] Line Network 800-SUICIDE (846-5358)        Your appointments     May 10, 2017  8:00 AM   Adult Draw/Collection with LAB SKILLED NURSING   LAB - [facility] (--)    [address] [facility] [place] [address]   [phone]            Jun 13, 2017  1:40 PM   New Patient with [facility] [address]    [address]  [address]  [place] [id number] [phone]           Please bring Photo ID, Insurance Cards, All Medication Bottles and copies of any legal documents (such as Living Will, Power of ) If speaking a language besides English please bring an adult . Please arrive 30 minutes prior for check in and registration. You will be receiving a confirmation call a few days before your appointment from our automated call confirmation system.            Jun 16, 2017  1:15 PM   MR HEAD 60 with [address] MRI 2   [facility] - MRI - [address]    [address]  [place] [id number] [phone]            Jun 20, 2017  3:30 PM   Follow Up with MARILEE Avery  Radiation Therapy (--)    [address] [facility] [address]   [phone]                 Discharge Medication Instructions:    Below are the medications your physician expects you to take upon discharge:    Review all your home medications and newly ordered medications with your doctor and/or pharmacist. Follow medication instructions as directed by your doctor and/or pharmacist.    Please keep your medication list with you and share with your physician.               Medication List      START taking these medications        Instructions    Morning Afternoon Evening Bedtime    albuterol 108 (90 BASE) MCG/ACT Aers inhalation aerosol   Last time this was given:  2 Puffs on 5/9/2017 10:07 AM   Next Dose Due:  2pm        Inhale 2 Puffs by mouth every 4 hours.   Dose:  2 Puff                          CONTINUE taking these medications        Instructions    Morning Afternoon Evening Bedtime    dexamethasone 2 MG tablet   Last time this was given:  2 mg on 5/9/2017 10:07 AM   Commonly known as:  DECADRON   Next Dose Due:  Tomorrow Morning          Take 2 mg by mouth every day. Decreasing dose, unsure of what the dose is today.   Dose:  2 mg                        memantine 10 MG Tabs   Last time this was given:  10 mg on 5/9/2017 10:07 AM   Commonly known as:  NAMENDA   Next Dose Due:  Tomorrow Morning          Take 1 Tab by mouth 2 times a day.   Dose:  10 mg                        metoprolol 50 MG Tabs   Last time this was given:  50 mg on 5/9/2017 10:07 AM   Commonly known as:  LOPRESSOR   Next Dose Due:  Tonight          Take 50 mg by mouth 2 times a day.   Dose:  50 mg                        SYNTHROID 25 MCG Tabs   Last time this was given:  25 mcg on 5/9/2017  6:21 AM   Generic drug:  levothyroxine   Next Dose Due:  Tomorrow Morning        Take 25 mcg by mouth Every morning on an empty stomach.   Dose:  25 mcg                             Where to Get Your Medications      Information about where to get these  medications is not yet available     ! Ask your nurse or doctor about these medications    - albuterol 108 (90 BASE) MCG/ACT Aers inhalation aerosol            Orders for after discharge     REFERRAL TO SKILLED NURSING FACILITY    Complete by:  As directed              Instructions           Diet / Nutrition:    Follow any diet instructions given to you by your doctor or the dietician, including how much salt (sodium) you are allowed each day.    If you are overweight, talk to your doctor about a weight reduction plan.    Activity:    Remain physically active following your doctor's instructions about exercise and activity.    Rest often.     Any time you become even a little tired or short of breath, SIT DOWN and rest.    Worsening Symptoms:    Report any of the following signs and symptoms to the doctor's office immediately:    *Pain of jaw, arm, or neck  *Chest pain not relieved by medication                               *Dizziness or loss of consciousness  *Difficulty breathing even when at rest   *More tired than usual                                       *Bleeding drainage or swelling of surgical site  *Swelling of feet, ankles, legs or stomach                 *Fever (>100ºF)  *Pink or blood tinged sputum  *Weight gain (3lbs/day or 5lbs /week)           *Shock from internal defibrillator (if applicable)  *Palpitations or irregular heartbeats                *Cool and/or numb extremities    Stroke Awareness    Common Risk Factors for Stroke include:    Age  Atrial Fibrillation  Carotid Artery Stenosis  Diabetes Mellitus  Excessive alcohol consumption  High blood pressure  Overweight   Physical inactivity  Smoking    Warning signs and symptoms of a stroke include:    *Sudden numbness or weakness of the face, arm or leg (especially on one side of the body).  *Sudden confusion, trouble speaking or understanding.  *Sudden trouble seeing in one or both eyes.  *Sudden trouble walking, dizziness, loss of balance or  coordination.Sudden severe headache with no known cause.    It is very important to get treatment quickly when a stroke occurs. If you experience any of the above warning signs, call 911 immediately.                   Disclaimer         Quit Smoking / Tobacco Use:    I understand the use of any tobacco products increases my chance of suffering from future heart disease or stroke and could cause other illnesses which may shorten my life. Quitting the use of tobacco products is the single most important thing I can do to improve my health. For further information on smoking / tobacco cessation call a Toll Free Quit Line at 1-388.743.9581 (*National [employer]) or 1-738.923.4959 (American Lung Association) or you can access the web based program at www.lungMyWishBoard.org.    [place] Tobacco Users Help Line:  (450) 800-8406       Toll Free: 1-121.164.9485  Quit Tobacco Program [facility] (262)526-1201    Crisis Hotline:    [place] Crisis Hotline:  2-894-HLPYWDQ or 1-727.695.4040    [place] Crisis Hotline:    1-609.267.9942 or 285-131-9155    Discharge Survey:   Thank you for choosing [facility]. We hope we did everything we could to make your hospital stay a pleasant one. You may be receiving a phone survey and we would appreciate your time and participation in answering the questions. Your input is very valuable to us in our efforts to improve our service to our patients and their families.        My signature on this form indicates that:    1. I have reviewed and understand the above information.  2. My questions regarding this information have been answered to my satisfaction.  3. I have formulated a plan with my discharge nurse to obtain my prescribed medications for home.                  Disclaimer         __________________________________                     __________       ________                       Patient Signature                                                 Date                     Time

## 2017-05-04 NOTE — IP AVS SNAPSHOT
" <p align=\"LEFT\"><IMG SRC=\"//EMRWB/blob$/[email or web address]\" alt=\"Image\" WIDTH=\"50%\" HEIGHT=\"200\" BORDER=\"\"></p>                   Name:Sonny Carter  Medical Record Number:[id number]  CSN: [id number]    YOB: 1940   Age: 77 y.o.  Sex: male  HT:1.727 m (5' 8\") WT: 109.4 kg (241 lb 2.9 oz)          Admit Date: 5/4/2017     Discharge Date:   Today's Date: 5/9/2017  Attending Doctor:  Jesus Cano M.D.                  Allergies:  Review of patient's allergies indicates no known allergies.          Your appointments     May 10, 2017  8:00 AM   Adult Draw/Collection with LAB SKILLED NURSING   LAB - SKILLED NURSING (--)    [address] [place] [id number]   [phone]            Jun 13, 2017  1:40 PM   New Patient with EPIFANIO Ramos   [facility] [address]    [address]  [address]  [place] [id number] [phone]           Please bring Photo ID, Insurance Cards, All Medication Bottles and copies of any legal documents (such as Living Will, Power of ) If speaking a language besides English please bring an adult . Please arrive 30 minutes prior for check in and registration. You will be receiving a confirmation call a few days before your appointment from our automated call confirmation system.            Jun 16, 2017  1:15 PM   MR HEAD 60 with [address] MRI 2   [facility] - MRI - [address]    [address]  [place] [id number] [phone]            Jun 20, 2017  3:30 PM   Follow Up with Justus KWOK M.D.   [facility] (--)    [address] [facility] [place] [address]   [phone]                 Medication List      Take these Medications        Instructions    albuterol 108 (90 BASE) MCG/ACT Aers inhalation aerosol    Inhale 2 Puffs by mouth every 4 hours.   Dose:  2 Puff       dexamethasone 2 MG tablet   Commonly known as:  DECADRON    Take 2 mg by mouth every day. Decreasing dose, unsure of what the dose is " today.   Dose:  2 mg       memantine 10 MG Tabs   Commonly known as:  NAMENDA    Take 1 Tab by mouth 2 times a day.   Dose:  10 mg       metoprolol 50 MG Tabs   Commonly known as:  LOPRESSOR    Take 50 mg by mouth 2 times a day.   Dose:  50 mg       SYNTHROID 25 MCG Tabs   Generic drug:  levothyroxine    Take 25 mcg by mouth Every morning on an empty stomach.   Dose:  25 mcg

## 2017-05-04 NOTE — ED NOTES
Left chest power port accessed per pt request without difficulty using sterile field/technique, positive blood return.

## 2017-05-05 LAB
ANION GAP SERPL CALC-SCNC: 9 MMOL/L (ref 0–11.9)
ANISOCYTOSIS BLD QL SMEAR: ABNORMAL
BASOPHILS # BLD AUTO: 0 % (ref 0–1.8)
BASOPHILS # BLD: 0 K/UL (ref 0–0.12)
BUN SERPL-MCNC: 29 MG/DL (ref 8–22)
CALCIUM SERPL-MCNC: 8.4 MG/DL (ref 8.5–10.5)
CHLORIDE SERPL-SCNC: 103 MMOL/L (ref 96–112)
CO2 SERPL-SCNC: 26 MMOL/L (ref 20–33)
CREAT SERPL-MCNC: 0.83 MG/DL (ref 0.5–1.4)
DACRYOCYTES BLD QL SMEAR: NORMAL
EOSINOPHIL # BLD AUTO: 0 K/UL (ref 0–0.51)
EOSINOPHIL NFR BLD: 0 % (ref 0–6.9)
ERYTHROCYTE [DISTWIDTH] IN BLOOD BY AUTOMATED COUNT: 74.4 FL (ref 35.9–50)
EST. AVERAGE GLUCOSE BLD GHB EST-MCNC: 177 MG/DL
GFR SERPL CREATININE-BSD FRML MDRD: >60 ML/MIN/1.73 M 2
GLUCOSE BLD-MCNC: 126 MG/DL (ref 65–99)
GLUCOSE BLD-MCNC: 160 MG/DL (ref 65–99)
GLUCOSE BLD-MCNC: 165 MG/DL (ref 65–99)
GLUCOSE BLD-MCNC: 212 MG/DL (ref 65–99)
GLUCOSE SERPL-MCNC: 136 MG/DL (ref 65–99)
HBA1C MFR BLD: 7.8 % (ref 0–5.6)
HCT VFR BLD AUTO: 47 % (ref 42–52)
HGB BLD-MCNC: 15.3 G/DL (ref 14–18)
LYMPHOCYTES # BLD AUTO: 0.56 K/UL (ref 1–4.8)
LYMPHOCYTES NFR BLD: 7.9 % (ref 22–41)
MAGNESIUM SERPL-MCNC: 1.9 MG/DL (ref 1.5–2.5)
MANUAL DIFF BLD: NORMAL
MCH RBC QN AUTO: 30.6 PG (ref 27–33)
MCHC RBC AUTO-ENTMCNC: 32.6 G/DL (ref 33.7–35.3)
MCV RBC AUTO: 94 FL (ref 81.4–97.8)
MICROCYTES BLD QL SMEAR: ABNORMAL
MONOCYTES # BLD AUTO: 0.06 K/UL (ref 0–0.85)
MONOCYTES NFR BLD AUTO: 0.9 % (ref 0–13.4)
MORPHOLOGY BLD-IMP: NORMAL
MYELOCYTES NFR BLD MANUAL: 1.7 %
NEUTROPHILS # BLD AUTO: 6.35 K/UL (ref 1.82–7.42)
NEUTROPHILS NFR BLD: 85.1 % (ref 44–72)
NEUTS BAND NFR BLD MANUAL: 4.4 % (ref 0–10)
NRBC # BLD AUTO: 0.02 K/UL
NRBC BLD AUTO-RTO: 0.3 /100 WBC
OVALOCYTES BLD QL SMEAR: NORMAL
PLATELET # BLD AUTO: 96 K/UL (ref 164–446)
PLATELET BLD QL SMEAR: NORMAL
PMV BLD AUTO: 10.8 FL (ref 9–12.9)
POIKILOCYTOSIS BLD QL SMEAR: NORMAL
POLYCHROMASIA BLD QL SMEAR: NORMAL
POTASSIUM SERPL-SCNC: 4 MMOL/L (ref 3.6–5.5)
RBC # BLD AUTO: 5 M/UL (ref 4.7–6.1)
RBC BLD AUTO: PRESENT
SODIUM SERPL-SCNC: 138 MMOL/L (ref 135–145)
WBC # BLD AUTO: 7.1 K/UL (ref 4.8–10.8)

## 2017-05-05 PROCEDURE — 99232 SBSQ HOSP IP/OBS MODERATE 35: CPT | Performed by: HOSPITALIST

## 2017-05-05 PROCEDURE — G8988 SELF CARE GOAL STATUS: HCPCS | Mod: CJ

## 2017-05-05 PROCEDURE — G8987 SELF CARE CURRENT STATUS: HCPCS | Mod: CL

## 2017-05-05 PROCEDURE — 700102 HCHG RX REV CODE 250 W/ 637 OVERRIDE(OP): Performed by: INTERNAL MEDICINE

## 2017-05-05 PROCEDURE — 306581 SET INFUSION,POWERLOC 20G X 1: Performed by: INTERNAL MEDICINE

## 2017-05-05 PROCEDURE — 700102 HCHG RX REV CODE 250 W/ 637 OVERRIDE(OP): Performed by: HOSPITALIST

## 2017-05-05 PROCEDURE — 700105 HCHG RX REV CODE 258: Performed by: INTERNAL MEDICINE

## 2017-05-05 PROCEDURE — 80048 BASIC METABOLIC PNL TOTAL CA: CPT

## 2017-05-05 PROCEDURE — 97166 OT EVAL MOD COMPLEX 45 MIN: CPT

## 2017-05-05 PROCEDURE — 85007 BL SMEAR W/DIFF WBC COUNT: CPT

## 2017-05-05 PROCEDURE — A9270 NON-COVERED ITEM OR SERVICE: HCPCS | Performed by: INTERNAL MEDICINE

## 2017-05-05 PROCEDURE — 700111 HCHG RX REV CODE 636 W/ 250 OVERRIDE (IP): Performed by: INTERNAL MEDICINE

## 2017-05-05 PROCEDURE — A9270 NON-COVERED ITEM OR SERVICE: HCPCS | Performed by: HOSPITALIST

## 2017-05-05 PROCEDURE — 770020 HCHG ROOM/CARE - TELE (206)

## 2017-05-05 PROCEDURE — 82962 GLUCOSE BLOOD TEST: CPT

## 2017-05-05 PROCEDURE — 85027 COMPLETE CBC AUTOMATED: CPT

## 2017-05-05 PROCEDURE — 83735 ASSAY OF MAGNESIUM: CPT

## 2017-05-05 RX ORDER — ACETAMINOPHEN 325 MG/1
650 TABLET ORAL EVERY 6 HOURS PRN
Status: DISCONTINUED | OUTPATIENT
Start: 2017-05-05 | End: 2017-05-09 | Stop reason: HOSPADM

## 2017-05-05 RX ADMIN — STANDARDIZED SENNA CONCENTRATE AND DOCUSATE SODIUM 2 TABLET: 8.6; 5 TABLET, FILM COATED ORAL at 09:00

## 2017-05-05 RX ADMIN — INSULIN LISPRO 1 UNITS: 100 INJECTION, SOLUTION INTRAVENOUS; SUBCUTANEOUS at 16:43

## 2017-05-05 RX ADMIN — INSULIN LISPRO 1 UNITS: 100 INJECTION, SOLUTION INTRAVENOUS; SUBCUTANEOUS at 22:06

## 2017-05-05 RX ADMIN — DEXAMETHASONE 2 MG: 1 TABLET ORAL at 09:00

## 2017-05-05 RX ADMIN — MEMANTINE HYDROCHLORIDE 10 MG: 10 TABLET ORAL at 09:00

## 2017-05-05 RX ADMIN — METOPROLOL TARTRATE 50 MG: 50 TABLET, FILM COATED ORAL at 22:15

## 2017-05-05 RX ADMIN — SODIUM CHLORIDE: 9 INJECTION, SOLUTION INTRAVENOUS at 12:10

## 2017-05-05 RX ADMIN — MEMANTINE HYDROCHLORIDE 10 MG: 10 TABLET ORAL at 22:13

## 2017-05-05 RX ADMIN — ALBUTEROL SULFATE 2 PUFF: 90 AEROSOL, METERED RESPIRATORY (INHALATION) at 12:18

## 2017-05-05 RX ADMIN — ALBUTEROL SULFATE 2 PUFF: 90 AEROSOL, METERED RESPIRATORY (INHALATION) at 19:57

## 2017-05-05 RX ADMIN — LEVOTHYROXINE SODIUM 25 MCG: 25 TABLET ORAL at 06:06

## 2017-05-05 RX ADMIN — INSULIN LISPRO 2 UNITS: 100 INJECTION, SOLUTION INTRAVENOUS; SUBCUTANEOUS at 10:57

## 2017-05-05 RX ADMIN — ALBUTEROL SULFATE 2 PUFF: 90 AEROSOL, METERED RESPIRATORY (INHALATION) at 06:23

## 2017-05-05 RX ADMIN — ACETAMINOPHEN 650 MG: 325 TABLET, FILM COATED ORAL at 08:59

## 2017-05-05 RX ADMIN — ENOXAPARIN SODIUM 40 MG: 100 INJECTION SUBCUTANEOUS at 22:13

## 2017-05-05 RX ADMIN — SIMVASTATIN 20 MG: 20 TABLET, FILM COATED ORAL at 22:13

## 2017-05-05 RX ADMIN — ALBUTEROL SULFATE 2 PUFF: 90 AEROSOL, METERED RESPIRATORY (INHALATION) at 16:08

## 2017-05-05 RX ADMIN — STANDARDIZED SENNA CONCENTRATE AND DOCUSATE SODIUM 2 TABLET: 8.6; 5 TABLET, FILM COATED ORAL at 22:15

## 2017-05-05 ASSESSMENT — ENCOUNTER SYMPTOMS
PALPITATIONS: 0
HEARTBURN: 0
BLOOD IN STOOL: 0
SPEECH CHANGE: 0
CHILLS: 0
TREMORS: 0
BACK PAIN: 0
DEPRESSION: 0
ORTHOPNEA: 0
BLURRED VISION: 0
CONSTIPATION: 0
SENSORY CHANGE: 0
PHOTOPHOBIA: 0
VOMITING: 0
NAUSEA: 0
NERVOUS/ANXIOUS: 0
COUGH: 1
PND: 0
DIZZINESS: 0
FEVER: 0
HEMOPTYSIS: 0
SPUTUM PRODUCTION: 0
SORE THROAT: 0
HEADACHES: 0
WEAKNESS: 1
TINGLING: 0
DOUBLE VISION: 0
MYALGIAS: 0
MEMORY LOSS: 0
CLAUDICATION: 0
SHORTNESS OF BREATH: 1
STRIDOR: 0
NECK PAIN: 0
EYE PAIN: 0

## 2017-05-05 ASSESSMENT — PAIN SCALES - GENERAL
PAINLEVEL_OUTOF10: 0

## 2017-05-05 ASSESSMENT — ACTIVITIES OF DAILY LIVING (ADL): TOILETING: REQUIRES ASSIST

## 2017-05-05 NOTE — RESPIRATORY CARE
COPD EDUCATION by COPD CLINICAL EDUCATOR  5/5/2017 at 6:44 AM by Carolyn Leggett     Patient reviewed by COPD education team. Patient does not qualify for COPD program.

## 2017-05-05 NOTE — THERAPY
"Occupational Therapy Evaluation completed.   Functional Status:  Pt presenting to skilled OT services following acute respiratory failure, lung ca. Pt demonstrating moderate to severe decline with ADLs, noted sob with activites, generalized strength and endurance in presence of multiple medical co-morbidities. Pt would benefit from acute skilled services while in house.  Plan of Care: Will benefit from Occupational Therapy 3 times per week  Discharge Recommendations:  Equipment: Will Continue to Assess for Equipment Needs. Post-acute therapy Discharge to a transitional care facility for continued skilled therapy services.    See \"Rehab Therapy-Acute\" Patient Summary Report for complete documentation.    "

## 2017-05-05 NOTE — H&P
PRIMARY CARE PHYSICIAN:  Dr. Raya Villatoro.    OUTPATIENT ONCOLOGIST:  Dr. Gomez.    CHIEF COMPLAINT:  Fatigue, shortness of breath with exertion.    SUBJECTIVE AND HISTORY OF PRESENT ILLNESS:  The patient is a 77-year-old male   with a history of obesity, lung cancer, on chemo and radiation, follow up with   Dr. Gomez, COPD who presents with shortness of breath with exertion and   weakness.    The patient says he is currently undergoing chemo and radiation for lung   cancer.  He had his last chemo on Monday and recently completed 10 days of   radiation 7 days ago.  About 7 days ago, he noticed that he began feeling   generally weak.  He would become very out of breath with minimal exertion.  He   denies any wheezing or cough.  He has not had any fever.  He has had some   constipation and denies any diarrhea.  He has had an increased appetite;   however, he says he may have been losing weight as he has not been able to eat   as much despite wanting to.  He has been so weak.  He is unable to walk as he   said his knees and ankles feel like they are going to buckle.  He is not   dizzy and does not feel like the room is spinning.  He denies any focal   weakness of extremities, but feels generally weak to the point where it was   hard to able to get out of the bed.  This is what brought him to the ER for   evaluation today.    EMERGENCY ROOM COURSE:  In the ER, he was noted to be hypoxic in the 80s   requiring 3-4 L nasal cannula.  He does not wear oxygen at home.  CTA was done   and was negative for PE.  His x-ray was also clear.  He will be admitted for   PT and OT as well as oxygen support.    REVIEW OF SYSTEMS:  He says shortness of breath, generalized weakness and   weight loss.  He has also had fatigue and decreased p.o. intake.    REVIEW OF SYSTEMS:  All systems reviewed and otherwise negative aside from   mentioned above.    SOCIAL HISTORY:  Former smoker, 2 packs a day for 50 years, quit about 25   years ago.   Denies recreational drugs, denies alcohol.    PAST MEDICAL HISTORY:  Hypertension; history of MI, status post 2 stents in   ; history of lung cancer, currently on chemo and radiation; COPD; obesity.    PAST SURGICAL HISTORY:  Craniotomy in 2015, port placement in 2015.    ALLERGIES:  No known medication allergies.    MEDICATIONS PRIOR TO ADMISSION:  Decadron 2 mg daily, Namenda 10 mg b.i.d.,   Synthroid 25 mcg daily, metoprolol 50 b.i.d., Zocor 20 mg nightly.    FAMILY HISTORY:  He said his mom had breast cancer and brother had prostate   cancer.  No known family history of heart attack or stroke in first-degree   relatives.    OBJECTIVE:  VITAL SIGNS:  Blood pressure 106/71, pulse 83, temperature 37.1, respirations   20, oxygen saturations 91% on 3 liters nasal cannula.  GENERAL:  Lying supine, nontoxic, very pleasant.  HEENT:  Dry mucous membranes.  Normal dentition.  PSYCHIATRIC:  Alert and oriented, good historian.  No confusion.  NECK:  Trachea is midline.  Nonpalpable thyroid.  HEART:  Regular rate and rhythm.  No murmurs, distant heart sounds.  Radial   pulses are 2+ bilaterally.  LUNGS:  Clear to auscultation bilaterally.  He does have a decreased   inspiratory effort.  No wheezing, no rhonchi.  No accessory muscle use.  ABDOMEN:  Soft, obese, nontender, nondistended.  No hepatosplenomegaly.  SKIN:  Warm and dry.  He has some hypopigmentation on his face.  No rashes in   visible areas.  NEUROLOGIC:  Cranial nerves are intact.  Sensation is symmetrical in upper and   lower extremities bilaterally.    PERTINENT LABORATORY VALUES:  Hemoglobin 13.6, platelets 123, creatinine 0.86,   glucose 198.    IMAGIN.  CT head without contrast, enhancing mass identified on prior MRI, not well   visualized on current CT.  No significant vasogenic edema, could consider   MRI.  2.  Chest x-ray reviewed independently per radiology, hypoinflation with mild   bibasilar atelectasis, superimposed pneumonia difficult to  exclude.  3.  CTA negative for PE, small mass is again seen, scattered areas of discoid   atelectasis, small bilateral pleural effusions.    ASSESSMENT AND PLAN:  The patient is a 77-year-old male with a history of lung   cancer with metastases to the brain, currently undergoing chemo and   radiation, chronic obstructive pulmonary disease, hypertension and obesity who   presents with generalized weakness and shortness of breath with exertion.  1.  Acute respiratory failure with hypoxia.  His CTA was negative for   pulmonary embolism.  He does have very small bilateral pleural effusions,   however, overall appears volume depleted.  He also has evidence of   atelectasis.  He does not have clinical evidence of pneumonia.  I will order   incentive spirometer and provide supplemental oxygen as needed.  I will also   order as-needed albuterol inhalers if this should provide symptomatic relief.    Suspect this may be related to deconditioning given he is undergoing chemo   and radiation.  I anticipate his anemia may further drop, which also may be   contributing to his symptoms of shortness of breath.  2.  Lung cancer with metastases to the brain.  He is currently on   chemoradiation.  He is immunosuppressed.  There are no beds available in   oncology; however, he will have a private room on telemetry.  He will be   transferred to oncology when a bed is available.  3.  Immunocompromised.  4.  Coronary artery disease, history of stents.  Continue metoprolol and   simvastatin.  5.  Hypertension.  We will continue metoprolol with holding parameters.  6.  Diabetes.  I will order an A1c and provide sliding scale as needed.  7.  Elevated D-dimer.  CTA was negative, likely due to malignancy.  8.  Lactic acidosis.  He does appear somewhat dry.  I will provide normal   saline and repeat lactic acid in 6 hours.  9.  Normocytic anemia, mild, likely due to chronic disease and chemo.  We will   monitor.  He has no evidence of  bleeding.  10.  Hypothyroidism.  Continue Synthroid.  Check TSH.  11.  Brain metastases.  We will continue Decadron.  Could consider increasing   this dose if he should have any additional symptoms.  Also could consider MRI,   however, he has nothing focal on exam.  12.  Deep venous thrombosis prophylaxis with Lovenox.  13.  He will be admitted under inpatient status given his acute respiratory   failure with hypoxia.  He will require greater than 2 midnights.    His code status is full.       ____________________________________     DO PEDRO Dillon / JAIR    DD:  05/04/2017 22:19:03  DT:  05/04/2017 23:13:38    D#:  [id number]  Job#:  [id number]

## 2017-05-05 NOTE — PROGRESS NOTES
Hospital Medicine Progress Note, Adult, Complex               Author: Danish Todsariah Date & Time created: 5/5/2017  11:01 AM     Interval History:    77 y.o male with PMHx of metastatic lung cancer, undergoing chemotherapy and radiation, he recently completed 10 days of radiation 7 days ago, admitted for generalized fatigue and shortness of breath on exertion.    Patient reports mild improvement in his symptoms since admission otherwise grossly unchanged. Denies having chest pains, denies shaving fevers/chills or productive cough.   CTA PE: negative , however multiepl lung masses and small pleural effusions.  Continue agerssive RT protocol.  PT/OT  Palliative care consulted.    Review of Systems:  Review of Systems   Constitutional: Positive for malaise/fatigue. Negative for fever and chills.   HENT: Negative for congestion, hearing loss, sore throat and tinnitus.    Eyes: Negative for blurred vision, double vision, photophobia and pain.   Respiratory: Positive for cough and shortness of breath. Negative for hemoptysis, sputum production and stridor.    Cardiovascular: Negative for chest pain, palpitations, orthopnea, claudication and PND.   Gastrointestinal: Negative for heartburn, nausea, vomiting, constipation, blood in stool and melena.   Genitourinary: Negative for dysuria, urgency and frequency.   Musculoskeletal: Negative for myalgias, back pain and neck pain.   Neurological: Positive for weakness. Negative for dizziness, tingling, tremors, sensory change, speech change and headaches.   Psychiatric/Behavioral: Negative for depression, suicidal ideas and memory loss. The patient is not nervous/anxious.        Physical Exam:  Physical Exam   Constitutional: He is oriented to person, place, and time. He appears well-nourished. He appears distressed.   HENT:   Head: Normocephalic and atraumatic.   Mouth/Throat: No oropharyngeal exudate.   Eyes: Conjunctivae are normal. Pupils are equal, round, and reactive to  light. Right eye exhibits no discharge. No scleral icterus.   Neck: Neck supple. No JVD present. No thyromegaly present.   Cardiovascular: Intact distal pulses.    No murmur heard.  Pulmonary/Chest: Effort normal. No stridor. No respiratory distress. He has no wheezes. He has rales (bibasilar).   Abdominal: Soft. Bowel sounds are normal. He exhibits no distension. There is no tenderness. There is no rebound.   Musculoskeletal: Normal range of motion. He exhibits no edema.   Neurological: He is alert and oriented to person, place, and time.   Skin: Skin is warm. He is not diaphoretic. No erythema.   Psychiatric: He has a normal mood and affect. His behavior is normal. Thought content normal.       Labs:        Invalid input(s): [id number]  Recent Labs      17   BNPBTYPENAT  33     Recent Labs      17   0531   SODIUM  135  138   POTASSIUM  4.1  4.0   CHLORIDE  103  103   CO2  22  26   BUN  30*  29*   CREATININE  0.86  0.83   MAGNESIUM   --   1.9   CALCIUM  8.8  8.4*     Recent Labs      17   0531   ALTSGPT  37   --    ASTSGOT  17   --    ALKPHOSPHAT  76   --    TBILIRUBIN  1.1   --    GLUCOSE  198*  136*     Recent Labs      17   0531   RBC  4.45*  5.00   HEMOGLOBIN  13.6*  15.3   HEMATOCRIT  41.3*  47.0   PLATELETCT  123*  96*     Recent Labs      17   0531   WBC  7.0  7.1   NEUTSPOLYS  95.60*  85.10*   LYMPHOCYTES  4.40*  7.90*   MONOCYTES  0.00  0.90   EOSINOPHILS  0.00  0.00   BASOPHILS  0.00  0.00   ASTSGOT  17   --    ALTSGPT  37   --    ALKPHOSPHAT  76   --    TBILIRUBIN  1.1   --            Hemodynamics:  Temp (24hrs), Av.9 °C (98.4 °F), Min:36.2 °C (97.2 °F), Max:38.2 °C (100.8 °F)  Temperature: 37.3 °C (99.2 °F)  Pulse  Av.7  Min: 68  Max: 95Heart Rate (Monitored): 83  Blood Pressure : 104/56 mmHg, NIBP: 131/68 mmHg     Respiratory:    Respiration: (!) 22, Pulse Oximetry: 93 %, O2 Daily  Delivery Respiratory : Nasal Cannula     Given By:: Mouthpiece, Work Of Breathing / Effort: Mild  RUL Breath Sounds: Clear, RML Breath Sounds: Diminished, RLL Breath Sounds: Diminished, MARGY Breath Sounds: Clear, LLL Breath Sounds: Diminished  Fluids:    Intake/Output Summary (Last 24 hours) at 05/05/17 1101  Last data filed at 05/05/17 0600   Gross per 24 hour   Intake      0 ml   Output    250 ml   Net   -250 ml     Weight: 107.6 kg (237 lb 3.4 oz)  GI/Nutrition:  Orders Placed This Encounter   Procedures   • Diet Order     Standing Status: Standing      Number of Occurrences: 1      Standing Expiration Date:      Order Specific Question:  Diet:     Answer:  Cardiac [6]     Medical Decision Making, by Problem:  Active Hospital Problems    Diagnosis   • *Acute respiratory failure with hypoxia (CMS-HCC) [J96.01]  - CTA PE: negative , however multiple lung masses and small pleural effusions.  Continue agerssive RT protocol.  - continue incentive spirometry      • Lung cancer (CMS-HCC) [C34.90]  - with metastatisis, to brain  - will defer to outpatient oncology for continued chemotherapy and radiation.     • Normocytic anemia [D64.9]   - No signs of gross bleeding, continue to monitor daily cbc for acute changes.     • Thrombocytopenia (CMS-[facility]) [D69.6]  - stable, cont sherri onitor with daily cbc.     • Lactic acidosis [E87.2]  - resolved with fluids.     • Elevated d-dimer [R79.89]  - possibly reactive, CTA PE negative.     • Hyperglycemia [R73.9]  - Continue Insulin-sliding scale, accu-checks and hypoglycemia protocol.     • Brain metastasis (CMS-HCC) [C79.31]  - CT Head: no signfiicant changes from MRI however non specific findings,   - continue decadron.  - given recent radiation, I will hold off on repeating an MRI given time that is needed for therapy changes to occur, will defer to .     • CAD (coronary artery disease) [I25.10]  - continue metoprolol, statin for cardiac protective measures     • HTN  (hypertension) [I10]  - controlled, continue metoprolol.     • Immunocompromised (CMS-HCC) [D84.9]  - stable no issues.       Patient plan of care discussed at multidisplinary team rounds and with patient and R.N at beside.      Labs reviewed, Medications reviewed, Radiology images reviewed and EKG reviewed  Tony catheter: No Tony      DVT Prophylaxis: Enoxaparin (Lovenox)    Ulcer prophylaxis: Not indicated

## 2017-05-05 NOTE — PROGRESS NOTES
Bedside report received. No overnight events. Patient A&O x 4. Temp 100.8, BP on low side at 100/65. Currently on 4 L via oxy mask. No complaints of pain at this time. POC discussed with patient. Pt verbalizes understanding. Call light and belongings with in reach. Bed locked and in lowest position, alarm and fall precautions in place.

## 2017-05-05 NOTE — PROGRESS NOTES
Pt arrived to the unit at approximately 2200.  Pt in mild distress, on 4L NC.  Pt denies any complaints, requesting food. On the monitor pt in sinus rhythm/sinus tachy.  Bed locked in lowest position, bed alarm on, and call light within reach.  Will continue to monitor and follow plan of care.

## 2017-05-05 NOTE — PROGRESS NOTES
"2RN Skin check  Pt has dry, brittle nails on left and right foot, and right hand, pt states \"it from the chemo.\"  Right big toe nail is black. Right ear is red, but blanching.  Pt's buttock is red, but blanching.  No other open sores or wounds.   "

## 2017-05-05 NOTE — CARE PLAN
Problem: Safety  Goal: Will remain free from falls  Outcome: PROGRESSING AS EXPECTED  Pt educated about being a fall risk, pt uses call bell for assistance.  Bed alarm on and bed locked in lowest position.      Problem: Knowledge Deficit  Goal: Knowledge of disease process/condition, treatment plan, diagnostic tests, and medications will improve  Outcome: PROGRESSING AS EXPECTED  Pt educated about current plan of care such as medications and lab values.  Pt expresses verbal understanding.

## 2017-05-05 NOTE — ED NOTES
Pt assisted to bedside to use urinal.  Pt became dyspneic with very little exertion.  ERP at bedside for re-eval.  Urine sample sent to lab.

## 2017-05-05 NOTE — CONSULTS
Reason for PC Consult: Advance Care Planning    Consulted by:   Dr. Martinez    Assessment:  General:   77 year old male admitted for acute respiratory failure on 5/4/17. Pt has a history of HTN, MI with 2 stents in 2005, COPD, Obesity, and Lung cancer to which he is currently receiving chemo and radiation. He lives at home with his wife, they just moved from [place] and do not have a PCP.     Dyspnea: Denies, 94% on 4L Oxymask  Last BM:  (PTA)  Pain: Denies  Depression: Denies    Spiritual:  Is Sikhism or spirituality important for coping with this illness? No  Has a  or spiritual provider visit been requested? No    Palliative Performance Scale:  50%    Advance Directive: None on File  DPOA: None on File, JOEL Carter ([phone])    POLST: None on File    Code Status: Full    Outcome:  PC RN introduced self and role of PC. Discussed pt's clinical picture, pt verbalizes understanding of cancer status and does not wish to stop treatments. Wife, Rayna asks about hospice, PC RN discussed in detail hospice program. Rayna asks who they need to talk to if they choose hospice later after treatments, PC RN discussed going to his PCP for referral. Rayna states they do not have a PCP, PC RN will have SW follow up and assist with establishing a PCP. PC RN discussed Advanced directive and POLST, pt agrees to complete. PC RN assists pt in completing form, awaiting notary. Pt and wife deny any further needs. PC RN provided contact card and encourage them to call with any questions or concerns.     Updated:   Dr. Martinez, WILLIAM    Plan:   SW will help establish PCP for pt, AD and POLST complete, will continue to support pt.     Thank you for allowing Palliative Care to participate in this patient's care. Please feel free to call [phone] with any questions or concerns.

## 2017-05-05 NOTE — ED PROVIDER NOTES
ED Provider Note    CHIEF COMPLAINT  Chief Complaint   Patient presents with   • Tired     Pt reports increasing fatigue over the past week.  Pt has history of lung cancer with metastasis to the brain and liver.  Pt has been getting radiation and chemotherapy.  Pt's oncologist is MD Gomez.        HPI  Sonny Carter is a 77 y.o. male with a history of hypertension, coronary artery disease, status post stent placement ×2, stage IV sacral lung cancer, previous 2 pack per day smoker who presents with complaints of increased fatigue and weakness over the past several days. The patient has a history of stage IV lung cancer, with metastases to the brain and liver. He is currently undergoing chemotherapy and radiation therapy.  The patient says that he has not been eating or drinking much the last 2 days because of loss of appetite. He normally is able to walk without difficulty, but today was unable to get out of bed because of weakness. He was transported to the ER for further evaluation.  He denies any headache, chest pain, back pain, abdominal pain. He has had no fever, chills, sore throat, cough, congestion, or any difficulty breathing. He has had no urinary symptoms or diarrhea. The patient appears slightly tachypnea, and his wife notes that he has been getting very short of breath with just minimal exertional activity. The patient now says that he is hungry and would like something to eat.    REVIEW OF SYSTEMS  See HPI for further details. All other systems are negative.     PAST MEDICAL HISTORY  Past Medical History   Diagnosis Date   • Hypertension    • Weakness      right arm/leg   • Fall    • MI (myocardial infarction) 2005     2 stents placed    • Unspecified cataract      bilateral IOL   • Bronchitis 2014   • High cholesterol    • Mass 3/2015     LL lung   • Cancer      lung CA       FAMILY HISTORY  No family history on file.    SOCIAL HISTORY  Social History     Social History   • Marital Status:  "     Spouse Name: N/A   • Number of Children: N/A   • Years of Education: N/A     Social History Main Topics   • Smoking status: Former Smoker -- 2.00 packs/day for 50 years     Types: Cigarettes     Quit date: 03/25/2005   • Smokeless tobacco: Not on file   • Alcohol Use: No   • Drug Use: No   • Sexual Activity: Not on file     Other Topics Concern   • Not on file     Social History Narrative       SURGICAL HISTORY  Past Surgical History   Procedure Laterality Date   • Craniotomy stealth  4/1/2015     [place] [facility] [place] placement Left 5/12/2015     Procedure: CATH PLACEMENT PORT;  Surgeon: Joseph Eisenberg M.D.;  [place] [facility] [place]  Service:        CURRENT MEDICATIONS  Home Medications     Reviewed by Jimena Felix (Pharmacy Tech) on 05/04/17 at 1556  Med List Status: Partial    Medication Last Dose Status    dexamethasone (DECADRON) 2 MG tablet unsure Active    levothyroxine (SYNTHROID) 25 MCG Tab 5/4/2017 Active    memantine (NAMENDA) 10 MG Tab 5/4/2017 Active    metoprolol (LOPRESSOR) 50 MG TABS 5/4/2017 Active    simvastatin (ZOCOR) 20 MG TABS 5/3/2017 Active                ALLERGIES  No Known Allergies    PHYSICAL EXAM  VITAL SIGNS: /71 mmHg  Pulse 83  Temp(Src) 37.1 °C (98.7 °F)  Resp 20  Ht 1.727 m (5' 7.99\")  Wt 119.296 kg (263 lb)  BMI 40.00 kg/m2  SpO2 91%  Constitutional: Awake, alert, in no acute distress, Non-toxic appearance.   HENT: Atraumatic. Bilateral external ears normal, mucous membranes moist, throat nonerythematous without exudates, nose is normal.  Eyes: PERRL, EOMI, conjunctiva moist, noninjected.  Neck: Nontender, Normal range of motion, No nuchal rigidity, No stridor.   Lymphatic: No lymphadenopathy noted.   Cardiovascular: Regular rate and rhythm, no murmurs, rubs, gallops.  Thorax & Lungs:  Good breath sounds bilaterally, no wheezes, rales, or retractions.  No chest tenderness.  Abdomen: " Bowel sounds normal, Soft, nontender, nondistended, no rebound, guarding, masses.  Back: No CVA or spinal tenderness.  Extremities: Intact distal pulses, No edema, No tenderness.   Skin: Warm, Dry, No rashes.   Musculoskeletal: No joint swelling or tenderness.  Neurologic: Alert & oriented x 3, sensory and motor function normal. No focal deficits.   Psychiatric: Affect normal, Judgment normal, Mood normal.     EKG  Twelve-lead EKG shows normal sinus rhythm, rate of 80, normal intervals, normal axis, no acute ST wave elevations or ST depressions, no pathologic Q waves, inverted T waves in leads V1, 2, no evidence of an acute injury or ischemic pattern on my reading, in comparison to a previous EKG from March, 2015, there are no acute changes.        RADIOLOGY/PROCEDURES  CT-CTA CHEST PULMONARY ARTERY W/ RECONS   Final Result      1.  No evidence of pulmonary emboli.   2.  Redemonstration of a small spiculated mass in the superior segment of the left lower lobe, without significant change from the recent prior scan.   3.  Scattered areas of discoid atelectasis, mainly within the lower lobes.   4.  Small bilateral pleural effusions.               CT-HEAD W/O   Final Result         1.  Enhancing mass is identified on prior MRI are not well visualized on the current noncontrast CT examination. No significant vasogenic edema appears to be present. MRI with contrast could be obtained for further evaluation of clinically indicated.      2.  No parenchymal or extra-axial hemorrhage is appreciated.      3.  Encephalomalacia posteriorly in left cerebral hemisphere is noted. Superimposed vasogenic edema in this area may also be present which appeared more extensive on the prior MRI.      Findings are consistent with atrophy.  Decreased attenuation in the periventricular white matter likely indicates microvascular ischemic disease.      DX-CHEST-PORTABLE (1 VIEW)   Final Result      Hypoinflation with mild bibasilar atelectasis.   Superimposed pneumonia is difficult to exclude.            COURSE & MEDICAL DECISION MAKING  Pertinent Labs & Imaging studies reviewed. (See chart for details)  The patient presents with the above complaints. IV was placed, he was given normal saline. EKG shows a normal sinus rhythm. Chest x-ray shows hyperinflation with mild bi-basilar atelectasis. CT scan of the head without contrast shows no acute abnormalities. CBC white count 7000, 96% polys, hemoglobin 13.6, lactic acid elevated 2.5, chemistry shows a glucose 198, BUN 30, otherwise normal, urinalysis negative. D-dimer was elevated 348. BNP 33.  On reexamination, the patient continues to die any pain or nausea. He was able to tolerate oral fluids. He did get up using a urinal and became very short of breath with minimal exertional activity. He is given a Atrovent/OB or nebulizer treatment. CT scan of the chest was negative for pulmonary embolism, showed a spiculated mass in the left lower lobe, and bilateral small pleural effusions. Given his symptoms he will be admitted. Case was discussed with Dr. Diaz.       FINAL IMPRESSION  1. Weakness  2. Respiratory insufficiency  3. History of stage IV lung cancer         Electronically signed by: Favio Angel, 5/4/2017 10:39 PM

## 2017-05-06 LAB
ALBUMIN SERPL BCP-MCNC: 3.2 G/DL (ref 3.2–4.9)
ALBUMIN/GLOB SERPL: 1.3 G/DL
ALP SERPL-CCNC: 68 U/L (ref 30–99)
ALT SERPL-CCNC: 26 U/L (ref 2–50)
ANION GAP SERPL CALC-SCNC: 9 MMOL/L (ref 0–11.9)
ANISOCYTOSIS BLD QL SMEAR: ABNORMAL
AST SERPL-CCNC: 27 U/L (ref 12–45)
BACTERIA UR CULT: NORMAL
BASOPHILS # BLD AUTO: 0.9 % (ref 0–1.8)
BASOPHILS # BLD: 0.05 K/UL (ref 0–0.12)
BILIRUB SERPL-MCNC: 0.9 MG/DL (ref 0.1–1.5)
BUN SERPL-MCNC: 25 MG/DL (ref 8–22)
CALCIUM SERPL-MCNC: 8.3 MG/DL (ref 8.5–10.5)
CHLORIDE SERPL-SCNC: 106 MMOL/L (ref 96–112)
CO2 SERPL-SCNC: 23 MMOL/L (ref 20–33)
CREAT SERPL-MCNC: 0.8 MG/DL (ref 0.5–1.4)
EOSINOPHIL # BLD AUTO: 0 K/UL (ref 0–0.51)
EOSINOPHIL NFR BLD: 0 % (ref 0–6.9)
ERYTHROCYTE [DISTWIDTH] IN BLOOD BY AUTOMATED COUNT: 71.3 FL (ref 35.9–50)
GFR SERPL CREATININE-BSD FRML MDRD: >60 ML/MIN/1.73 M 2
GLOBULIN SER CALC-MCNC: 2.5 G/DL (ref 1.9–3.5)
GLUCOSE BLD-MCNC: 143 MG/DL (ref 65–99)
GLUCOSE BLD-MCNC: 149 MG/DL (ref 65–99)
GLUCOSE BLD-MCNC: 168 MG/DL (ref 65–99)
GLUCOSE BLD-MCNC: 170 MG/DL (ref 65–99)
GLUCOSE SERPL-MCNC: 149 MG/DL (ref 65–99)
HCT VFR BLD AUTO: 38.2 % (ref 42–52)
HGB BLD-MCNC: 12.4 G/DL (ref 14–18)
LYMPHOCYTES # BLD AUTO: 0.47 K/UL (ref 1–4.8)
LYMPHOCYTES NFR BLD: 8.8 % (ref 22–41)
MAGNESIUM SERPL-MCNC: 1.9 MG/DL (ref 1.5–2.5)
MANUAL DIFF BLD: NORMAL
MCH RBC QN AUTO: 30.3 PG (ref 27–33)
MCHC RBC AUTO-ENTMCNC: 32.5 G/DL (ref 33.7–35.3)
MCV RBC AUTO: 93.4 FL (ref 81.4–97.8)
MICROCYTES BLD QL SMEAR: ABNORMAL
MONOCYTES # BLD AUTO: 0 K/UL (ref 0–0.85)
MONOCYTES NFR BLD AUTO: 0 % (ref 0–13.4)
MORPHOLOGY BLD-IMP: NORMAL
NEUTROPHILS # BLD AUTO: 4.79 K/UL (ref 1.82–7.42)
NEUTROPHILS NFR BLD: 85.9 % (ref 44–72)
NEUTS BAND NFR BLD MANUAL: 4.4 % (ref 0–10)
NRBC # BLD AUTO: 0 K/UL
NRBC BLD AUTO-RTO: 0 /100 WBC
OVALOCYTES BLD QL SMEAR: NORMAL
PLATELET # BLD AUTO: 107 K/UL (ref 164–446)
PLATELET BLD QL SMEAR: NORMAL
PMV BLD AUTO: 10.3 FL (ref 9–12.9)
POIKILOCYTOSIS BLD QL SMEAR: NORMAL
POTASSIUM SERPL-SCNC: 3.9 MMOL/L (ref 3.6–5.5)
PROT SERPL-MCNC: 5.7 G/DL (ref 6–8.2)
RBC # BLD AUTO: 4.09 M/UL (ref 4.7–6.1)
RBC BLD AUTO: PRESENT
SIGNIFICANT IND 70042: NORMAL
SITE SITE: NORMAL
SODIUM SERPL-SCNC: 138 MMOL/L (ref 135–145)
SOURCE SOURCE: NORMAL
WBC # BLD AUTO: 5.3 K/UL (ref 4.8–10.8)

## 2017-05-06 PROCEDURE — G8978 MOBILITY CURRENT STATUS: HCPCS | Mod: CM

## 2017-05-06 PROCEDURE — 700105 HCHG RX REV CODE 258: Performed by: INTERNAL MEDICINE

## 2017-05-06 PROCEDURE — 83735 ASSAY OF MAGNESIUM: CPT

## 2017-05-06 PROCEDURE — 700111 HCHG RX REV CODE 636 W/ 250 OVERRIDE (IP): Performed by: INTERNAL MEDICINE

## 2017-05-06 PROCEDURE — 80053 COMPREHEN METABOLIC PANEL: CPT

## 2017-05-06 PROCEDURE — 770020 HCHG ROOM/CARE - TELE (206)

## 2017-05-06 PROCEDURE — 36415 COLL VENOUS BLD VENIPUNCTURE: CPT

## 2017-05-06 PROCEDURE — 700102 HCHG RX REV CODE 250 W/ 637 OVERRIDE(OP): Performed by: INTERNAL MEDICINE

## 2017-05-06 PROCEDURE — 85027 COMPLETE CBC AUTOMATED: CPT

## 2017-05-06 PROCEDURE — 97163 PT EVAL HIGH COMPLEX 45 MIN: CPT

## 2017-05-06 PROCEDURE — 82962 GLUCOSE BLOOD TEST: CPT

## 2017-05-06 PROCEDURE — A9270 NON-COVERED ITEM OR SERVICE: HCPCS | Performed by: INTERNAL MEDICINE

## 2017-05-06 PROCEDURE — 85007 BL SMEAR W/DIFF WBC COUNT: CPT

## 2017-05-06 PROCEDURE — 99232 SBSQ HOSP IP/OBS MODERATE 35: CPT | Performed by: HOSPITALIST

## 2017-05-06 PROCEDURE — G8979 MOBILITY GOAL STATUS: HCPCS | Mod: CJ

## 2017-05-06 PROCEDURE — 700111 HCHG RX REV CODE 636 W/ 250 OVERRIDE (IP): Performed by: HOSPITALIST

## 2017-05-06 RX ORDER — FUROSEMIDE 10 MG/ML
20 INJECTION INTRAMUSCULAR; INTRAVENOUS ONCE
Status: COMPLETED | OUTPATIENT
Start: 2017-05-06 | End: 2017-05-06

## 2017-05-06 RX ADMIN — LEVOTHYROXINE SODIUM 25 MCG: 25 TABLET ORAL at 05:48

## 2017-05-06 RX ADMIN — ALBUTEROL SULFATE 2 PUFF: 90 AEROSOL, METERED RESPIRATORY (INHALATION) at 05:48

## 2017-05-06 RX ADMIN — STANDARDIZED SENNA CONCENTRATE AND DOCUSATE SODIUM 2 TABLET: 8.6; 5 TABLET, FILM COATED ORAL at 09:29

## 2017-05-06 RX ADMIN — DEXAMETHASONE 2 MG: 1 TABLET ORAL at 09:29

## 2017-05-06 RX ADMIN — SODIUM CHLORIDE: 9 INJECTION, SOLUTION INTRAVENOUS at 02:41

## 2017-05-06 RX ADMIN — ALBUTEROL SULFATE 2 PUFF: 90 AEROSOL, METERED RESPIRATORY (INHALATION) at 23:00

## 2017-05-06 RX ADMIN — FUROSEMIDE 20 MG: 10 INJECTION, SOLUTION INTRAMUSCULAR; INTRAVENOUS at 09:30

## 2017-05-06 RX ADMIN — ALBUTEROL SULFATE 2 PUFF: 90 AEROSOL, METERED RESPIRATORY (INHALATION) at 02:42

## 2017-05-06 RX ADMIN — INSULIN LISPRO 1 UNITS: 100 INJECTION, SOLUTION INTRAVENOUS; SUBCUTANEOUS at 05:53

## 2017-05-06 RX ADMIN — ALBUTEROL SULFATE 2 PUFF: 90 AEROSOL, METERED RESPIRATORY (INHALATION) at 19:30

## 2017-05-06 RX ADMIN — MEMANTINE HYDROCHLORIDE 10 MG: 10 TABLET ORAL at 09:29

## 2017-05-06 RX ADMIN — INSULIN LISPRO 1 UNITS: 100 INJECTION, SOLUTION INTRAVENOUS; SUBCUTANEOUS at 16:37

## 2017-05-06 RX ADMIN — SODIUM CHLORIDE: 9 INJECTION, SOLUTION INTRAVENOUS at 16:18

## 2017-05-06 RX ADMIN — ENOXAPARIN SODIUM 40 MG: 100 INJECTION SUBCUTANEOUS at 20:51

## 2017-05-06 RX ADMIN — ALBUTEROL SULFATE 2 PUFF: 90 AEROSOL, METERED RESPIRATORY (INHALATION) at 12:04

## 2017-05-06 RX ADMIN — ALBUTEROL SULFATE 2 PUFF: 90 AEROSOL, METERED RESPIRATORY (INHALATION) at 16:18

## 2017-05-06 RX ADMIN — MEMANTINE HYDROCHLORIDE 10 MG: 10 TABLET ORAL at 20:51

## 2017-05-06 RX ADMIN — SIMVASTATIN 20 MG: 20 TABLET, FILM COATED ORAL at 22:00

## 2017-05-06 RX ADMIN — STANDARDIZED SENNA CONCENTRATE AND DOCUSATE SODIUM 2 TABLET: 8.6; 5 TABLET, FILM COATED ORAL at 20:51

## 2017-05-06 ASSESSMENT — ENCOUNTER SYMPTOMS
VOMITING: 0
PALPITATIONS: 0
TINGLING: 0
MYALGIAS: 0
BLURRED VISION: 0
SORE THROAT: 0
ORTHOPNEA: 0
HEMOPTYSIS: 0
BACK PAIN: 0
NAUSEA: 0
CONSTIPATION: 0
PND: 0
TREMORS: 0
DEPRESSION: 0
DIZZINESS: 0
PHOTOPHOBIA: 0
EYE PAIN: 0
SENSORY CHANGE: 0
NECK PAIN: 0
FEVER: 0
CLAUDICATION: 0
SHORTNESS OF BREATH: 0
STRIDOR: 0
HEARTBURN: 0
SPEECH CHANGE: 0
NERVOUS/ANXIOUS: 0
BLOOD IN STOOL: 0
SPUTUM PRODUCTION: 0
HEADACHES: 0
DOUBLE VISION: 0
COUGH: 0
WEAKNESS: 1
MEMORY LOSS: 0
CHILLS: 0

## 2017-05-06 ASSESSMENT — PAIN SCALES - GENERAL
PAINLEVEL_OUTOF10: 0

## 2017-05-06 ASSESSMENT — GAIT ASSESSMENTS: GAIT LEVEL OF ASSIST: UNABLE TO PARTICIPATE

## 2017-05-06 NOTE — PROGRESS NOTES
Assumed pt care at 1900, bedside report received.  Pt has mild work or breathing, when getting up to void, pt instructed to use urinal while remaining in bed, pt agrees.  No complaints of pain.  Remains on 4L oxymask.  Bed locked in lowest position, call light within reach, and bed alarm on.  Will continue to monitor and follow plan of care.

## 2017-05-06 NOTE — CARE PLAN
Problem: Knowledge Deficit  Goal: Knowledge of disease process/condition, treatment plan, diagnostic tests, and medications will improve  Discussed POC and medications with patient, pt. verbalized understanding.     Problem: Skin Integrity  Goal: Risk for impaired skin integrity will decrease  Skin assessment completed, Q 2 turns in place. Pillows in use for positioning and to float heels. Frequent linen changes to ensure patient stays dry. Hourly rounding in place.

## 2017-05-06 NOTE — CARE PLAN
Problem: Respiratory:  Goal: Respiratory status will improve  Intervention: Assess and monitor pulmonary status  Pt gets short of breath with activity, pt remains on 4L oxymask      Problem: Skin Integrity  Goal: Risk for impaired skin integrity will decrease  Intervention: Assess and monitor skin integrity, appearance and/or temperature  Pt has been assisted to turn in bed q2h, skin remains intact

## 2017-05-06 NOTE — PROGRESS NOTES
Hospital Medicine Progress Note, Adult, Complex               Author: Danish Martinez Date & Time created: 5/6/2017  11:53 AM     Interval History:    77 y.o male with PMHx of metastatic lung cancer, undergoing chemotherapy and radiation, he recently completed 10 days of radiation 7 days ago, admitted for generalized fatigue and shortness of breath on exertion.    Patient reports mild improvement in his symptoms since admission otherwise grossly unchanged. Denies having chest pains, denies shaving fevers/chills or productive cough.   CTA PE: negative , however multiepl lung masses and small pleural effusions.  Continue agerssive RT protocol.  PT/OT  Palliative care consulted.    5/6  No issues overnight,altough maria fernandan does nto complain of shortness of breath he is hypoxic. CTA neg, considering mild pulmonay edema.  Started lasix 20mg , will cont to monitor progress      Review of Systems:  Review of Systems   Constitutional: Positive for malaise/fatigue. Negative for fever and chills.   HENT: Negative for congestion, hearing loss, sore throat and tinnitus.    Eyes: Negative for blurred vision, double vision, photophobia and pain.   Respiratory: Negative for cough, hemoptysis, sputum production, shortness of breath and stridor.    Cardiovascular: Negative for chest pain, palpitations, orthopnea, claudication and PND.   Gastrointestinal: Negative for heartburn, nausea, vomiting, constipation, blood in stool and melena.   Genitourinary: Negative for dysuria, urgency and frequency.   Musculoskeletal: Negative for myalgias, back pain and neck pain.   Neurological: Positive for weakness. Negative for dizziness, tingling, tremors, sensory change, speech change and headaches.   Psychiatric/Behavioral: Negative for depression, suicidal ideas and memory loss. The patient is not nervous/anxious.        Physical Exam:  Physical Exam   Constitutional: He is oriented to person, place, and time. He appears well-nourished. He  appears distressed.   HENT:   Head: Normocephalic and atraumatic.   Mouth/Throat: No oropharyngeal exudate.   Eyes: Conjunctivae are normal. Pupils are equal, round, and reactive to light. Right eye exhibits no discharge. No scleral icterus.   Neck: Neck supple. No JVD present. No thyromegaly present.   Cardiovascular: Intact distal pulses.    No murmur heard.  Pulmonary/Chest: No stridor. No respiratory distress. He has no wheezes. He has rales (bibasilar unchanged).   Abdominal: Soft. Bowel sounds are normal. He exhibits no distension. There is no tenderness. There is no rebound.   Musculoskeletal: Normal range of motion. He exhibits no edema.   Neurological: He is alert and oriented to person, place, and time.   Skin: Skin is warm. He is not diaphoretic. No erythema.   Psychiatric: He has a normal mood and affect. His behavior is normal. Thought content normal.       Labs:        Invalid input(s): [id number]  Recent Labs      05/04/17 1552   BNPBTYPENAT  33     Recent Labs      05/04/17 1552 05/05/17 0531 05/06/17   0238   SODIUM  135  138  138   POTASSIUM  4.1  4.0  3.9   CHLORIDE  103  103  106   CO2  22  26  23   BUN  30*  29*  25*   CREATININE  0.86  0.83  0.80   MAGNESIUM   --   1.9  1.9   CALCIUM  8.8  8.4*  8.3*     Recent Labs      05/04/17 1552 05/05/17 0531  05/06/17   0238   ALTSGPT  37   --   26   ASTSGOT  17   --   27   ALKPHOSPHAT  76   --   68   TBILIRUBIN  1.1   --   0.9   GLUCOSE  198*  136*  149*     Recent Labs      05/04/17 1552 05/05/17 0531  05/06/17   0238   RBC  4.45*  5.00  4.09*   HEMOGLOBIN  13.6*  15.3  12.4*   HEMATOCRIT  41.3*  47.0  38.2*   PLATELETCT  123*  96*  107*     Recent Labs      05/04/17 1552 05/05/17 0531  05/06/17   0238   WBC  7.0  7.1  5.3   NEUTSPOLYS  95.60*  85.10*  85.90*   LYMPHOCYTES  4.40*  7.90*  8.80*   MONOCYTES  0.00  0.90  0.00   EOSINOPHILS  0.00  0.00  0.00   BASOPHILS  0.00  0.00  0.90   ASTSGOT  17   --   27   ALTSGPT  37   --    26   ALKPHOSPHAT  76   --   68   TBILIRUBIN  1.1   --   0.9           Hemodynamics:  Temp (24hrs), Av.8 °C (98.2 °F), Min:36.3 °C (97.4 °F), Max:37.2 °C (99 °F)  Temperature: 37.2 °C (99 °F)  Pulse  Av.2  Min: 68  Max: 95   Blood Pressure : 105/65 mmHg     Respiratory:    Respiration: (!) 21, Pulse Oximetry: 92 %     Work Of Breathing / Effort: Mild  RUL Breath Sounds: Diminished, RML Breath Sounds: Diminished, RLL Breath Sounds: Diminished, MARGY Breath Sounds: Diminished, LLL Breath Sounds: Diminished  Fluids:    Intake/Output Summary (Last 24 hours) at 17 1153  Last data filed at 17 2100   Gross per 24 hour   Intake      0 ml   Output    600 ml   Net   -600 ml     Weight: 110.3 kg (243 lb 2.7 oz)  GI/Nutrition:  Orders Placed This Encounter   Procedures   • Diet Order     Standing Status: Standing      Number of Occurrences: 1      Standing Expiration Date:      Order Specific Question:  Diet:     Answer:  Cardiac [6]     Medical Decision Making, by Problem:  Active Hospital Problems    Diagnosis   • *Acute respiratory failure with hypoxia (CMS-HCC) [J96.01]  - CTA PE: negative , however multiple lung masses and small pleural effusions.  Continue agerssive RT protocol.  - continue incentive spirometry   - considering possibly mild pulm edema, trial with IV lasix, will wean oxygen as tolerated.     • Lung cancer (CMS-HCC) [C34.90]  - with metastatisis, to brain  - will defer to outpatient oncology for continued chemotherapy and radiation.     • Normocytic anemia [D64.9]   - No signs of gross bleeding, continue to monitor daily cbc for acute changes.     • Thrombocytopenia (CMS-HCC) [D69.6]  - stable, cont sherri onitor with daily cbc.     • Lactic acidosis [E87.2]  - resolved with fluids.     • Elevated d-dimer [R79.89]  - possibly reactive, CTA PE negative.     • Hyperglycemia [R73.9]  - Continue Insulin-sliding scale, accu-checks and hypoglycemia protocol.     • Brain metastasis (CMS-HCC)  [C79.31]  - CT Head: no signfiicant changes from MRI however non specific findings,   - continue decadron.  - given recent radiation, I will hold off on repeating an MRI given time that is needed for therapy changes to occur, will defer to .     • CAD (coronary artery disease) [I25.10]  - continue metoprolol, statin for cardiac protective measures     • HTN (hypertension) [I10]  - controlled, continue metoprolol.     • Immunocompromised (CMS-HCC) [D84.9]  - stable no issues.       Palliative consulted, discussed with patient, he elects to proceed with DNR, and hospice at a later time, family will discuss with PCP for hospice at some point.    Patient plan of care discussed at multidisplinary team rounds and with patient and R.N at [facility].      Labs reviewed, Medications reviewed, Radiology images reviewed and EKG reviewed  Tony catheter: No Tony      DVT Prophylaxis: Enoxaparin (Lovenox)    Ulcer prophylaxis: Not indicated

## 2017-05-06 NOTE — THERAPY
"Physical Therapy Evaluation completed.   Bed Mobility:  Supine to Sit: Maximal Assist  Transfers: Sit to Stand: Unable to Participate  Gait: Level Of Assist: Unable to Participate with Will Continue to Assess for Equipment Needs       Plan of Care: Will benefit from Physical Therapy 3 times per week  Discharge Recommendations: Equipment: Will Continue to Assess for Equipment Needs. Post-acute therapy Discharge to a transitional care facility for continued skilled therapy services.    Pt presents to acute PT s/p respiratory failure related to medical dx including Lung CA. Pt presents with significant weakness and debility. Pt is very limited in activity tolerance and fatigues quickly with exertion. Pt will continue to benefit from skilled acute PT to address impairments and assist with d/c planning while working to increase strength and Ind functional mobility.    See \"Rehab Therapy-Acute\" Patient Summary Report for complete documentation.     "

## 2017-05-06 NOTE — PROGRESS NOTES
Bedside report received. No overnight events. Patient A&O x 4. VS'S. Still requiring 4L via Oxy mask. No complaints of pain at this time. Bottom red and slow to edson Q2 turns initiated. POC discussed with patient. Pt verbalizes understanding. Call light and belongings with in reach. Bed locked and in lowest position, alarm and fall precautions in place.

## 2017-05-07 LAB
ALBUMIN SERPL BCP-MCNC: 3.1 G/DL (ref 3.2–4.9)
ALBUMIN/GLOB SERPL: 1.5 G/DL
ALP SERPL-CCNC: 62 U/L (ref 30–99)
ALT SERPL-CCNC: 21 U/L (ref 2–50)
ANION GAP SERPL CALC-SCNC: 9 MMOL/L (ref 0–11.9)
ANISOCYTOSIS BLD QL SMEAR: ABNORMAL
AST SERPL-CCNC: 28 U/L (ref 12–45)
BASOPHILS # BLD AUTO: 0 % (ref 0–1.8)
BASOPHILS # BLD: 0 K/UL (ref 0–0.12)
BILIRUB SERPL-MCNC: 1 MG/DL (ref 0.1–1.5)
BUN SERPL-MCNC: 23 MG/DL (ref 8–22)
CALCIUM SERPL-MCNC: 8 MG/DL (ref 8.5–10.5)
CHLORIDE SERPL-SCNC: 106 MMOL/L (ref 96–112)
CO2 SERPL-SCNC: 24 MMOL/L (ref 20–33)
CREAT SERPL-MCNC: 0.78 MG/DL (ref 0.5–1.4)
EOSINOPHIL # BLD AUTO: 0 K/UL (ref 0–0.51)
EOSINOPHIL NFR BLD: 0 % (ref 0–6.9)
ERYTHROCYTE [DISTWIDTH] IN BLOOD BY AUTOMATED COUNT: 70.4 FL (ref 35.9–50)
GFR SERPL CREATININE-BSD FRML MDRD: >60 ML/MIN/1.73 M 2
GLOBULIN SER CALC-MCNC: 2.1 G/DL (ref 1.9–3.5)
GLUCOSE BLD-MCNC: 125 MG/DL (ref 65–99)
GLUCOSE BLD-MCNC: 159 MG/DL (ref 65–99)
GLUCOSE BLD-MCNC: 161 MG/DL (ref 65–99)
GLUCOSE BLD-MCNC: 163 MG/DL (ref 65–99)
GLUCOSE SERPL-MCNC: 125 MG/DL (ref 65–99)
HCT VFR BLD AUTO: 33.9 % (ref 42–52)
HGB BLD-MCNC: 10.8 G/DL (ref 14–18)
LYMPHOCYTES # BLD AUTO: 0.27 K/UL (ref 1–4.8)
LYMPHOCYTES NFR BLD: 7.1 % (ref 22–41)
MAGNESIUM SERPL-MCNC: 1.9 MG/DL (ref 1.5–2.5)
MANUAL DIFF BLD: NORMAL
MCH RBC QN AUTO: 29.8 PG (ref 27–33)
MCHC RBC AUTO-ENTMCNC: 31.9 G/DL (ref 33.7–35.3)
MCV RBC AUTO: 93.4 FL (ref 81.4–97.8)
MICROCYTES BLD QL SMEAR: ABNORMAL
MONOCYTES # BLD AUTO: 0.03 K/UL (ref 0–0.85)
MONOCYTES NFR BLD AUTO: 0.9 % (ref 0–13.4)
MORPHOLOGY BLD-IMP: NORMAL
NEUTROPHILS # BLD AUTO: 3.5 K/UL (ref 1.82–7.42)
NEUTROPHILS NFR BLD: 86.6 % (ref 44–72)
NEUTS BAND NFR BLD MANUAL: 5.4 % (ref 0–10)
NRBC # BLD AUTO: 0 K/UL
NRBC BLD AUTO-RTO: 0 /100 WBC
PLATELET # BLD AUTO: 112 K/UL (ref 164–446)
PLATELET BLD QL SMEAR: NORMAL
PMV BLD AUTO: 12 FL (ref 9–12.9)
POLYCHROMASIA BLD QL SMEAR: NORMAL
POTASSIUM SERPL-SCNC: 3.4 MMOL/L (ref 3.6–5.5)
PROT SERPL-MCNC: 5.2 G/DL (ref 6–8.2)
RBC # BLD AUTO: 3.63 M/UL (ref 4.7–6.1)
RBC BLD AUTO: PRESENT
SODIUM SERPL-SCNC: 139 MMOL/L (ref 135–145)
TOXIC GRANULES BLD QL SMEAR: SLIGHT
WBC # BLD AUTO: 3.8 K/UL (ref 4.8–10.8)

## 2017-05-07 PROCEDURE — A9270 NON-COVERED ITEM OR SERVICE: HCPCS | Performed by: INTERNAL MEDICINE

## 2017-05-07 PROCEDURE — 700111 HCHG RX REV CODE 636 W/ 250 OVERRIDE (IP): Performed by: INTERNAL MEDICINE

## 2017-05-07 PROCEDURE — 770020 HCHG ROOM/CARE - TELE (206)

## 2017-05-07 PROCEDURE — 83735 ASSAY OF MAGNESIUM: CPT

## 2017-05-07 PROCEDURE — 700102 HCHG RX REV CODE 250 W/ 637 OVERRIDE(OP): Performed by: HOSPITALIST

## 2017-05-07 PROCEDURE — 700102 HCHG RX REV CODE 250 W/ 637 OVERRIDE(OP): Performed by: INTERNAL MEDICINE

## 2017-05-07 PROCEDURE — 36415 COLL VENOUS BLD VENIPUNCTURE: CPT

## 2017-05-07 PROCEDURE — 85027 COMPLETE CBC AUTOMATED: CPT

## 2017-05-07 PROCEDURE — A9270 NON-COVERED ITEM OR SERVICE: HCPCS | Performed by: HOSPITALIST

## 2017-05-07 PROCEDURE — 82962 GLUCOSE BLOOD TEST: CPT | Mod: 91

## 2017-05-07 PROCEDURE — 85007 BL SMEAR W/DIFF WBC COUNT: CPT

## 2017-05-07 PROCEDURE — 80053 COMPREHEN METABOLIC PANEL: CPT

## 2017-05-07 PROCEDURE — 700105 HCHG RX REV CODE 258: Performed by: INTERNAL MEDICINE

## 2017-05-07 PROCEDURE — 99232 SBSQ HOSP IP/OBS MODERATE 35: CPT | Performed by: HOSPITALIST

## 2017-05-07 PROCEDURE — 94760 N-INVAS EAR/PLS OXIMETRY 1: CPT

## 2017-05-07 RX ORDER — POTASSIUM CHLORIDE 20 MEQ/1
40 TABLET, EXTENDED RELEASE ORAL ONCE
Status: COMPLETED | OUTPATIENT
Start: 2017-05-07 | End: 2017-05-07

## 2017-05-07 RX ADMIN — STANDARDIZED SENNA CONCENTRATE AND DOCUSATE SODIUM 2 TABLET: 8.6; 5 TABLET, FILM COATED ORAL at 09:20

## 2017-05-07 RX ADMIN — INSULIN LISPRO 1 UNITS: 100 INJECTION, SOLUTION INTRAVENOUS; SUBCUTANEOUS at 05:46

## 2017-05-07 RX ADMIN — MEMANTINE HYDROCHLORIDE 10 MG: 10 TABLET ORAL at 09:16

## 2017-05-07 RX ADMIN — SIMVASTATIN 20 MG: 20 TABLET, FILM COATED ORAL at 20:05

## 2017-05-07 RX ADMIN — LEVOTHYROXINE SODIUM 25 MCG: 25 TABLET ORAL at 05:49

## 2017-05-07 RX ADMIN — INSULIN LISPRO 1 UNITS: 100 INJECTION, SOLUTION INTRAVENOUS; SUBCUTANEOUS at 11:41

## 2017-05-07 RX ADMIN — SODIUM CHLORIDE: 9 INJECTION, SOLUTION INTRAVENOUS at 18:46

## 2017-05-07 RX ADMIN — ALBUTEROL SULFATE 2 PUFF: 90 AEROSOL, METERED RESPIRATORY (INHALATION) at 11:39

## 2017-05-07 RX ADMIN — SODIUM CHLORIDE: 9 INJECTION, SOLUTION INTRAVENOUS at 04:00

## 2017-05-07 RX ADMIN — DEXAMETHASONE 2 MG: 1 TABLET ORAL at 09:17

## 2017-05-07 RX ADMIN — ALBUTEROL SULFATE 2 PUFF: 90 AEROSOL, METERED RESPIRATORY (INHALATION) at 18:46

## 2017-05-07 RX ADMIN — POTASSIUM CHLORIDE 40 MEQ: 1500 TABLET, EXTENDED RELEASE ORAL at 09:16

## 2017-05-07 RX ADMIN — ALBUTEROL SULFATE 2 PUFF: 90 AEROSOL, METERED RESPIRATORY (INHALATION) at 15:19

## 2017-05-07 RX ADMIN — METOPROLOL TARTRATE 50 MG: 50 TABLET, FILM COATED ORAL at 09:16

## 2017-05-07 RX ADMIN — ENOXAPARIN SODIUM 40 MG: 100 INJECTION SUBCUTANEOUS at 20:04

## 2017-05-07 RX ADMIN — INSULIN LISPRO 1 UNITS: 100 INJECTION, SOLUTION INTRAVENOUS; SUBCUTANEOUS at 16:32

## 2017-05-07 RX ADMIN — ALBUTEROL SULFATE 2 PUFF: 90 AEROSOL, METERED RESPIRATORY (INHALATION) at 05:49

## 2017-05-07 RX ADMIN — MEMANTINE HYDROCHLORIDE 10 MG: 10 TABLET ORAL at 20:05

## 2017-05-07 ASSESSMENT — ENCOUNTER SYMPTOMS
HEARTBURN: 0
TINGLING: 0
PHOTOPHOBIA: 0
STRIDOR: 0
NECK PAIN: 0
DIZZINESS: 0
FEVER: 0
ORTHOPNEA: 0
VOMITING: 0
BACK PAIN: 0
CLAUDICATION: 0
HEMOPTYSIS: 0
NERVOUS/ANXIOUS: 0
PND: 0
CONSTIPATION: 0
DEPRESSION: 0
SHORTNESS OF BREATH: 0
TREMORS: 0
COUGH: 0
PALPITATIONS: 0
DOUBLE VISION: 0
SPEECH CHANGE: 0
NAUSEA: 0
BLURRED VISION: 0
CHILLS: 0
SENSORY CHANGE: 0
WEAKNESS: 1
MYALGIAS: 0
MEMORY LOSS: 0
BLOOD IN STOOL: 0
HEADACHES: 0
EYE PAIN: 0
SPUTUM PRODUCTION: 0
SORE THROAT: 0

## 2017-05-07 ASSESSMENT — PAIN SCALES - GENERAL
PAINLEVEL_OUTOF10: 0

## 2017-05-07 NOTE — CONSULTS
"Pulmonary Consultation      Patient ID:   Name:             Sonny Carter   YOB: 1940  Age:                 77 y.o.  male   MRN:               [id number]                                                  Reason of Consult:   SOB, Hypoxemia     Requesting physician:  Eamon Martinez     History of Present Illness:  The patient is a 77-year-old male  with a history of obesity, lung cancer(Poorly differentiated adenocarcinoma), on chemo and radiation, follow up with Dr. Gomez, COPD who presents withshortness of breath with exertion and  weakness.     The patient says he is currently undergoing chemo and radiation for lung  cancer.  He had his last chemo on Monday and recently completed 10 days of whole brain  radiation 7 days ago.  About 7 days ago, he noticed that he began feeling  generally weak.  He would become very out of breath with minimal exertion.  He had some cough. With \"chest congestion not able to cough it up\" .  He has not had any fever.  He has had some  constipation and denies any diarrhea.   He has been so weak.  He is unable to walk as he said his knees and ankles feel like they are going to buckle.  He is not  dizzy and does not feel like the room is spinning.  He denies any focal  weakness of extremities, but feels generally weak to the point where it was  hard to able to get out of the bed.  This is what brought him to the ER for evaluation  Pulmonary service was consulted to help work up and manage his hypoxemia and diagnosis     Past Medical History:  Past Medical History   Diagnosis Date   • Hypertension    • Weakness      right arm/leg   • Fall    • MI (myocardial infarction) 2005     2 stents placed    • Unspecified cataract      bilateral IOL   • Bronchitis 2014   • High cholesterol    • Mass 3/2015     LL lung   • Cancer      lung CA       Past surgical history:  Past Surgical History   Procedure Laterality Date   • Craniotomy stealth  4/1/2015     Performed by Laurie SHARMA" MARILEE Marshall at SURGERY [facility]   • Cath placement Left 5/12/2015     Procedure: CATH PLACEMENT PORT;  Surgeon: Joseph Eisenberg M.D.;  Location: SURGERY [facility];  Service:        Family History:  No family history on file.    Hospital Medications:    Current facility-administered medications:   •  acetaminophen (TYLENOL) tablet 650 mg, 650 mg, Oral, Q6HRS PRN, Danish Martinez M.D., 650 mg at 05/05/17 0859  •  dexamethasone (DECADRON) tablet 2 mg, 2 mg, Oral, DAILY, ZACHARY Portillo.O., 2 mg at 05/07/17 0917  •  levothyroxine (SYNTHROID) tablet 25 mcg, 25 mcg, Oral, AM ES, ZACHARY Portillo.O., 25 mcg at 05/07/17 0549  •  memantine (NAMENDA) tablet 10 mg, 10 mg, Oral, BID, JOSH PortilloO., 10 mg at 05/07/17 0916  •  metoprolol (LOPRESSOR) tablet 50 mg, 50 mg, Oral, BID, ZACHARY Portillo.O., 50 mg at 05/07/17 0916  •  simvastatin (ZOCOR) tablet 20 mg, 20 mg, Oral, Nightly, ZACHARY Portillo.O., 20 mg at 05/06/17 2200  •  senna-docusate (PERICOLACE or SENOKOT S) 8.6-50 MG per tablet 2 Tab, 2 Tab, Oral, BID, 2 Tab at 05/07/17 0920 **AND** polyethylene glycol/lytes (MIRALAX) PACKET 1 Packet, 1 Packet, Oral, QDAY PRN **AND** magnesium hydroxide (MILK OF MAGNESIA) suspension 30 mL, 30 mL, Oral, QDAY PRN **AND** bisacodyl (DULCOLAX) suppository 10 mg, 10 mg, Rectal, QDAY PRN, JOSH PortilloO.  •  Respiratory Care per Protocol, , Nebulization, Continuous RT, Tatianna Diaz D.O.  •  NS infusion, , Intravenous, Continuous, Tatianna Diaz D.O., Last Rate: 75 mL/hr at 05/07/17 0400  •  enoxaparin (LOVENOX) inj 40 mg, 40 mg, Subcutaneous, QHS, JOSH PortilloO., 40 mg at 05/06/17 2051  •  ondansetron (ZOFRAN) syringe/vial injection 4 mg, 4 mg, Intravenous, Q4HRS PRN, Tatianna Diaz D.O.  •  ondansetron (ZOFRAN ODT) dispertab 4 mg, 4 mg, Oral, Q4HRS PRN, ZACHARY Portillo.CLAIR.  •  guaifenesin DM (ROBITUSSIN DM) 100-10 MG/5ML syrup 10 mL, 10 mL, Oral, Q6HRS PRN, Tatianna RUTLEDGE  "JEN Diaz  •  insulin lispro (HUMALOG) injection 1-6 Units, 1-6 Units, Subcutaneous, 4X/DAY ACHS, Tatianna Diaz D.O., 1 Units at 05/07/17 1141  •  Action is required: Protocol 1073 Hypoglycemia has been implemented, , , Once **AND** Protocol 1073 Inclusion Criteria, , , CONTINUOUS **AND** Protocol 1073 NOTIFY, , , Once **AND** Protocol 1073 Initiate protocol immediately if FSBG is less than or equal to 70 mg/dL, , , CONTINUOUS **AND** glucose 4 g chewable tablet 16 g, 16 g, Oral, Q15 MIN PRN **AND** dextrose 50% (D50W) injection 25 mL, 25 mL, Intravenous, Q15 MIN PRN, Tatianna Diaz D.O.  •  albuterol inhaler 2 Puff, 2 Puff, Inhalation, Q4HRS (RT), Tatianna Diaz D.O., 2 Puff at 05/07/17 1139    Current Outpatient Medications:  Prescriptions prior to admission   Medication Sig Dispense Refill Last Dose   • dexamethasone (DECADRON) 2 MG tablet Take 2 mg by mouth every day. Decreasing dose, unsure of what the dose is today.   5/4/2017 at Unknown time   • memantine (NAMENDA) 10 MG Tab Take 1 Tab by mouth 2 times a day. 60 Tab 5 5/4/2017 at Unknown time   • levothyroxine (SYNTHROID) 25 MCG Tab Take 25 mcg by mouth Every morning on an empty stomach.   5/4/2017   • metoprolol (LOPRESSOR) 50 MG TABS Take 50 mg by mouth 2 times a day.   5/4/2017 at am   • simvastatin (ZOCOR) 20 MG TABS Take 20 mg by mouth every evening.   5/3/2017 at pm       Medication Allergy:  No Known Allergies          Objective:   Vitals/ General Appearance:   Weight/BMI: Body mass index is 36.78 kg/(m^2).  Blood pressure 112/71, pulse 92, temperature 36.2 °C (97.2 °F), resp. rate 32, height 1.727 m (5' 8\"), weight 109.7 kg (241 lb 13.5 oz), SpO2 93 %.  Filed Vitals:    05/07/17 0441 05/07/17 0807 05/07/17 1011 05/07/17 1147   BP: 100/61 106/58  112/71   Pulse: 102 93 84 92   Temp: 37.3 °C (99.1 °F) 36.5 °C (97.7 °F)  36.2 °C (97.2 °F)   Resp: 24 20 20 32   Height:       Weight:       SpO2: 93% 96% 94% 93%     Oxygen Therapy:  Pulse " Oximetry: 93 %, O2 (LPM): 6, O2 Delivery: Mask    Constitutional:   Looks weak and drowsy,  No acute distress  HENMT:  Normocephalic, Atraumatic, Oropharynx moist mucous membranes, No oral exudates, Nose normal.  No thyromegaly.  Eyes:  EOMI, Conjunctiva normal, No discharge.  Neck:  Normal range of motion, No cervical tenderness,  no JVD.  Cardiovascular:  Normal heart rate, Normal rhythm, No murmurs, No rubs, No gallops.   Extremitites with intact distal pulses, no cyanosis, or edema.  Lungs:  Normal breath sounds, breath sounds clear to auscultation bilaterally,  no crackles, no wheezing.   Abdomen: Bowel sounds normal, Soft, No tenderness, No guarding, No rebound, No masses, No hepatosplenomegaly.  Skin: Warm, Dry, No erythema, No rash, no induration.  Neurologic: Alert & oriented x 3, No focal deficits noted, cranial nerves II through X are grossly intact.  Psychiatric: Affect normal, Judgment normal, Mood normal.    Labs:  Recent Labs      05/05/17 0531 05/06/17 0238 05/07/17 0148   WBC  7.1  5.3  3.8*   RBC  5.00  4.09*  3.63*   HEMOGLOBIN  15.3  12.4*  10.8*   HEMATOCRIT  47.0  38.2*  33.9*   MCV  94.0  93.4  93.4   MCH  30.6  30.3  29.8   MCHC  32.6*  32.5*  31.9*   RDW  74.4*  71.3*  70.4*   PLATELETCT  96*  107*  112*   MPV  10.8  10.3  12.0         Recent Labs      05/04/17   1552   BNPBTYPENAT  33     Recent Labs      05/04/17   1552   BNPBTYPENAT  33     Recent Labs      05/05/17   0531  05/06/17   0238  05/07/17   0148   SODIUM  138  138  139   POTASSIUM  4.0  3.9  3.4*   CHLORIDE  103  106  106   CO2  26  23  24   GLUCOSE  136*  149*  125*   BUN  29*  25*  23*     Recent Labs      05/05/17   0531  05/06/17   0238  05/07/17 0148   SODIUM  138  138  139   POTASSIUM  4.0  3.9  3.4*   CHLORIDE  103  106  106   CO2  26  23  24   BUN  29*  25*  23*   CREATININE  0.83  0.80  0.78   MAGNESIUM  1.9  1.9  1.9   CALCIUM  8.4*  8.3*  8.0*     No results found for this or any previous visit.       Imaging:   CT-CTA CHEST PULMONARY ARTERY W/ RECONS   Final Result      1.  No evidence of pulmonary emboli.   2.  Redemonstration of a small spiculated mass in the superior segment of the left lower lobe, without significant change from the recent prior scan.   3.  Scattered areas of discoid atelectasis, mainly within the lower lobes.   4.  Small bilateral pleural effusions.               CT-HEAD W/O   Final Result         1.  Enhancing mass is identified on prior MRI are not well visualized on the current noncontrast CT examination. No significant vasogenic edema appears to be present. MRI with contrast could be obtained for further evaluation of clinically indicated.      2.  No parenchymal or extra-axial hemorrhage is appreciated.      3.  Encephalomalacia posteriorly in left cerebral hemisphere is noted. Superimposed vasogenic edema in this area may also be present which appeared more extensive on the prior MRI.      Findings are consistent with atrophy.  Decreased attenuation in the periventricular white matter likely indicates microvascular ischemic disease.      DX-CHEST-PORTABLE (1 VIEW)   Final Result      Hypoinflation with mild bibasilar atelectasis.  Superimposed pneumonia is difficult to exclude.      ECHOCARDIOGRAM COMP W/O CONT    (Results Pending)           Assessment and Plan:  Principal Problem:   Acute respiratory failure with hypoxia (CMS-HCC)   CTA chest was negative for PE  His hypoxemia probably multifactorial including,lung cancer, and weakness from chemotherapy and whole brain radiation therapy, and possible history of COPD, given his heavy smoking history andobesity  the patient has very shallow breathing pattern probably from weakness. CT chest was personally reviewed and showed areas of atelectasis, this is probably playing a role in his hypoxemia too.   Doubt fluid overload and heart failure, given his normal BNP, agree with echocardiogram (Reading is pending)    At this point I  would recommend to continue O2 supplementation as needed to keep O2 above 90%, incentive spirometry, and out of bed to chair. Pt could benefit from NIPPV if he develop respiratory distress   Pt may need O2 at home after discharge.     Active Problems:    Lung cancer (CMS-HCC) POA: Yes    Immunocompromised (CMS-HCC) POA: Yes    CAD (coronary artery disease) POA: Yes    HTN (hypertension) POA: Yes    Brain metastasis (CMS-HCC) POA: Yes      Overview: Lung primary    Normocytic anemia POA: Unknown    Thrombocytopenia (CMS-HCC) POA: Unknown    Lactic acidosis POA: Unknown    Elevated d-dimer POA: Unknown    Hyperglycemia POA: Unknown      Thank you for the consultation. We will cont to follow with you

## 2017-05-07 NOTE — PROGRESS NOTES
Assumed care of pt. at 0700     Bedside report received from BRENDA Mcghee and POC discussed  Pt. Is asleep  Call light within reach    Bed locked and in lowest position.    Will continue to monitor

## 2017-05-07 NOTE — CARE PLAN
Problem: Safety  Goal: Will remain free from falls  Outcome: PROGRESSING AS EXPECTED  Appropriate fall precautions in place. Bed alarm on, locked, and in lowest position. Call light within reach    Problem: Mobility  Goal: Risk for activity intolerance will decrease  Outcome: PROGRESSING SLOWER THAN EXPECTED  Pt. Has generalized weakness and unable to ambulate at this time, with minimal strength in legs to move self around.

## 2017-05-07 NOTE — PROGRESS NOTES
"Assumed pt care at 1900, bed side report received.  Pt resting in bed.  Mild work of breathing per pt he feels \"better.\"  Lung sounds are diminished throughout, on 6L oxymask.  Bed locked in lowest position, call light within reach, and bed alarm on.    "

## 2017-05-07 NOTE — CARE PLAN
Problem: Respiratory:  Goal: Respiratory status will improve  Intervention: Assess and monitor pulmonary status  Pt has been having mild WOB during shift, pt states he feels fine and saturations have been maintained >90% on 6L oxymask, pt even receiving PRN nebulizer treatment

## 2017-05-07 NOTE — PROGRESS NOTES
Hospital Medicine Progress Note, Adult, Complex               Author: Sandramaria g Martinez Date & Time created: 5/7/2017  12:05 PM     Interval History:    77 y.o male with PMHx of metastatic lung cancer, undergoing chemotherapy and radiation, he recently completed 10 days of radiation 7 days ago, admitted for generalized fatigue and shortness of breath on exertion.    Patient reports mild improvement in his symptoms since admission otherwise grossly unchanged. Denies having chest pains, denies shaving fevers/chills or productive cough.   CTA PE: negative , however multiepl lung masses and small pleural effusions.  Continue agerssive RT protocol.  PT/OT  Palliative care consulted.    5/6  No issues overnight,altough patietn does nto complain of shortness of breath he is hypoxic. CTA neg, considering mild pulmonay edema.  Started lasix 20mg , will cont to monitor progress    5/7  No issues overnight, patient feels comfortable however still requiring 6L O2.  CTA PE : neg, Neg consolidations, no fevers. On decadron for metastatic diease  Trial lasix, minimal effect, boderline hypotension  Pulmonary consulted, appreciate recommendations.      Review of Systems:grossly unchanged.  Review of Systems   Constitutional: Positive for malaise/fatigue. Negative for fever and chills.   HENT: Negative for congestion, hearing loss, sore throat and tinnitus.    Eyes: Negative for blurred vision, double vision, photophobia and pain.   Respiratory: Negative for cough, hemoptysis, sputum production, shortness of breath and stridor.    Cardiovascular: Negative for chest pain, palpitations, orthopnea, claudication and PND.   Gastrointestinal: Negative for heartburn, nausea, vomiting, constipation, blood in stool and melena.   Genitourinary: Negative for dysuria, urgency and frequency.   Musculoskeletal: Negative for myalgias, back pain and neck pain.   Neurological: Positive for weakness. Negative for dizziness, tingling, tremors, sensory  change, speech change and headaches.   Psychiatric/Behavioral: Negative for depression, suicidal ideas and memory loss. The patient is not nervous/anxious.        Physical Exam:grossly unchanged.  Physical Exam   Constitutional: He is oriented to person, place, and time. He appears well-nourished. He appears distressed.   HENT:   Head: Normocephalic and atraumatic.   Mouth/Throat: No oropharyngeal exudate.   Eyes: Conjunctivae are normal. Pupils are equal, round, and reactive to light. Right eye exhibits no discharge. No scleral icterus.   Neck: Neck supple. No JVD present. No thyromegaly present.   Cardiovascular: Intact distal pulses.    No murmur heard.  Pulmonary/Chest: No stridor. No respiratory distress. He has no wheezes. He has rales (bibasilar unchanged).   Abdominal: Soft. Bowel sounds are normal. He exhibits no distension. There is no tenderness. There is no rebound.   Musculoskeletal: Normal range of motion. He exhibits no edema.   Neurological: He is alert and oriented to person, place, and time.   Skin: Skin is warm. He is not diaphoretic. No erythema.   Psychiatric: He has a normal mood and affect. His behavior is normal. Thought content normal.       Labs:        Invalid input(s): [id number]  Recent Labs      05/04/17   1552   BNPBTYPENAT  33     Recent Labs      05/05/17   0531  05/06/17   0238  05/07/17   0148   SODIUM  138  138  139   POTASSIUM  4.0  3.9  3.4*   CHLORIDE  103  106  106   CO2  26  23  24   BUN  29*  25*  23*   CREATININE  0.83  0.80  0.78   MAGNESIUM  1.9  1.9  1.9   CALCIUM  8.4*  8.3*  8.0*     Recent Labs      05/04/17   1552  05/05/17   0531  05/06/17   0238  05/07/17   0148   ALTSGPT  37   --   26  21   ASTSGOT  17   --   27  28   ALKPHOSPHAT  76   --   68  62   TBILIRUBIN  1.1   --   0.9  1.0   GLUCOSE  198*  136*  149*  125*     Recent Labs      05/05/17   0531  05/06/17   0238  05/07/17   0148   RBC  5.00  4.09*  3.63*   HEMOGLOBIN  15.3  12.4*  10.8*   HEMATOCRIT  47.0   38.2*  33.9*   PLATELETCT  96*  107*  112*     Recent Labs      17   1552  17   0531  17   0238  17   0148   WBC  7.0  7.1  5.3  3.8*   NEUTSPOLYS  95.60*  85.10*  85.90*  86.60*   LYMPHOCYTES  4.40*  7.90*  8.80*  7.10*   MONOCYTES  0.00  0.90  0.00  0.90   EOSINOPHILS  0.00  0.00  0.00  0.00   BASOPHILS  0.00  0.00  0.90  0.00   ASTSGOT  17   --   27  28   ALTSGPT  37   --   26  21   ALKPHOSPHAT  76   --   68  62   TBILIRUBIN  1.1   --   0.9  1.0           Hemodynamics:  Temp (24hrs), Av.9 °C (98.4 °F), Min:36.2 °C (97.2 °F), Max:37.4 °C (99.4 °F)  Temperature: 36.2 °C (97.2 °F)  Pulse  Av.2  Min: 68  Max: 102   Blood Pressure : 112/71 mmHg     Respiratory:    Respiration: (!) 32, Pulse Oximetry: 93 %, O2 Daily Delivery Respiratory : OxyMask     Work Of Breathing / Effort: Mild  RUL Breath Sounds: Diminished, RML Breath Sounds: Diminished, RLL Breath Sounds: Diminished, MARGY Breath Sounds: Diminished, LLL Breath Sounds: Diminished  Fluids:    Intake/Output Summary (Last 24 hours) at 17 1205  Last data filed at 17 1400   Gross per 24 hour   Intake      0 ml   Output   1000 ml   Net  -1000 ml     Weight: 109.7 kg (241 lb 13.5 oz)  GI/Nutrition:  Orders Placed This Encounter   Procedures   • Diet Order     Standing Status: Standing      Number of Occurrences: 1      Standing Expiration Date:      Order Specific Question:  Diet:     Answer:  Cardiac [6]     Medical Decision Making, by Problem:  Active Hospital Problems    Diagnosis   • *Acute respiratory failure with hypoxia (CMS-HCC) [J96.01]  - CTA PE: negative , however multiple lung masses and small pleural effusions.  Continue agerssive RT protocol.  - continue incentive spirometry   - trial lasix minimal effect, pt has borderline hypotension to begin with  - Pulmonary consulted appreciate recs.     • Lung cancer (CMS-HCC) [C34.90]  - with metastatisis, to brain  - will defer to outpatient oncology for continued  chemotherapy and radiation.     • Normocytic anemia [D64.9]   - No signs of gross bleeding, continue to monitor daily cbc for acute changes.     • Thrombocytopenia (CMS-HCC) [D69.6]  - stable overall, minimal improvement.  -  cont to monitor with daily cbc.     • Lactic acidosis [E87.2]  - resolved with fluids.     • Elevated d-dimer [R79.89]  - possibly reactive, CTA PE negative.     • Hyperglycemia [R73.9]  - Continue Insulin-sliding scale, accu-checks and hypoglycemia protocol.     • Brain metastasis (CMS-HCC) [C79.31]  - CT Head: no signfiicant changes from MRI however non specific findings,   - continue decadron.  - given recent radiation, I will hold off on repeating an MRI given time that is needed for therapy changes to occur, will defer to .     • CAD (coronary artery disease) [I25.10]  - continue metoprolol, statin for cardiac protective measures     • HTN (hypertension) [I10]  - controlled, continue metoprolol.     • Immunocompromised (CMS-HCC) [D84.9]  - stable no issues.       Hypokalemia  - replenish lytes, check bmp in the am.    Patient plan of care discussed at multidisplinary team rounds and with patient and R.N at [facility].      Labs reviewed, Medications reviewed, Radiology images reviewed and EKG reviewed  Tony catheter: No Tony      DVT Prophylaxis: Enoxaparin (Lovenox)    Ulcer prophylaxis: Not indicated

## 2017-05-08 ENCOUNTER — PATIENT OUTREACH (OUTPATIENT)
Dept: OTHER | Facility: MEDICAL CENTER | Age: 77
End: 2017-05-08

## 2017-05-08 LAB
ALBUMIN SERPL BCP-MCNC: 2.8 G/DL (ref 3.2–4.9)
ALBUMIN/GLOB SERPL: 1.1 G/DL
ALP SERPL-CCNC: 62 U/L (ref 30–99)
ALT SERPL-CCNC: 21 U/L (ref 2–50)
ANION GAP SERPL CALC-SCNC: 9 MMOL/L (ref 0–11.9)
ANISOCYTOSIS BLD QL SMEAR: ABNORMAL
AST SERPL-CCNC: 35 U/L (ref 12–45)
BASOPHILS # BLD AUTO: 0 % (ref 0–1.8)
BASOPHILS # BLD: 0 K/UL (ref 0–0.12)
BILIRUB SERPL-MCNC: 0.9 MG/DL (ref 0.1–1.5)
BUN SERPL-MCNC: 19 MG/DL (ref 8–22)
CALCIUM SERPL-MCNC: 8.1 MG/DL (ref 8.5–10.5)
CHLORIDE SERPL-SCNC: 107 MMOL/L (ref 96–112)
CO2 SERPL-SCNC: 22 MMOL/L (ref 20–33)
CREAT SERPL-MCNC: 0.68 MG/DL (ref 0.5–1.4)
EOSINOPHIL # BLD AUTO: 0 K/UL (ref 0–0.51)
EOSINOPHIL NFR BLD: 0 % (ref 0–6.9)
ERYTHROCYTE [DISTWIDTH] IN BLOOD BY AUTOMATED COUNT: 69.7 FL (ref 35.9–50)
GFR SERPL CREATININE-BSD FRML MDRD: >60 ML/MIN/1.73 M 2
GLOBULIN SER CALC-MCNC: 2.5 G/DL (ref 1.9–3.5)
GLUCOSE BLD-MCNC: 115 MG/DL (ref 65–99)
GLUCOSE BLD-MCNC: 117 MG/DL (ref 65–99)
GLUCOSE BLD-MCNC: 165 MG/DL (ref 65–99)
GLUCOSE BLD-MCNC: 168 MG/DL (ref 65–99)
GLUCOSE SERPL-MCNC: 104 MG/DL (ref 65–99)
HCT VFR BLD AUTO: 31.8 % (ref 42–52)
HGB BLD-MCNC: 10.1 G/DL (ref 14–18)
LV EJECT FRACT  99904: 55
LV EJECT FRACT MOD 2C 99903: 55.72
LV EJECT FRACT MOD 4C 99902: 57.18
LV EJECT FRACT MOD BP 99901: 56.03
LYMPHOCYTES # BLD AUTO: 0.38 K/UL (ref 1–4.8)
LYMPHOCYTES NFR BLD: 11.5 % (ref 22–41)
MACROCYTES BLD QL SMEAR: ABNORMAL
MAGNESIUM SERPL-MCNC: 1.9 MG/DL (ref 1.5–2.5)
MANUAL DIFF BLD: NORMAL
MCH RBC QN AUTO: 29.8 PG (ref 27–33)
MCHC RBC AUTO-ENTMCNC: 31.8 G/DL (ref 33.7–35.3)
MCV RBC AUTO: 93.8 FL (ref 81.4–97.8)
METAMYELOCYTES NFR BLD MANUAL: 0.9 %
MICROCYTES BLD QL SMEAR: ABNORMAL
MONOCYTES # BLD AUTO: 0 K/UL (ref 0–0.85)
MONOCYTES NFR BLD AUTO: 0 % (ref 0–13.4)
MORPHOLOGY BLD-IMP: NORMAL
MYELOCYTES NFR BLD MANUAL: 0.9 %
NEUTROPHILS # BLD AUTO: 2.86 K/UL (ref 1.82–7.42)
NEUTROPHILS NFR BLD: 82.3 % (ref 44–72)
NEUTS BAND NFR BLD MANUAL: 4.4 % (ref 0–10)
NRBC # BLD AUTO: 0 K/UL
NRBC BLD AUTO-RTO: 0 /100 WBC
OVALOCYTES BLD QL SMEAR: NORMAL
PLATELET # BLD AUTO: 135 K/UL (ref 164–446)
PLATELET BLD QL SMEAR: NORMAL
PMV BLD AUTO: 11 FL (ref 9–12.9)
POIKILOCYTOSIS BLD QL SMEAR: NORMAL
POTASSIUM SERPL-SCNC: 3.9 MMOL/L (ref 3.6–5.5)
PROT SERPL-MCNC: 5.3 G/DL (ref 6–8.2)
RBC # BLD AUTO: 3.39 M/UL (ref 4.7–6.1)
RBC BLD AUTO: PRESENT
SODIUM SERPL-SCNC: 138 MMOL/L (ref 135–145)
WBC # BLD AUTO: 3.3 K/UL (ref 4.8–10.8)

## 2017-05-08 PROCEDURE — 770020 HCHG ROOM/CARE - TELE (206)

## 2017-05-08 PROCEDURE — 83735 ASSAY OF MAGNESIUM: CPT

## 2017-05-08 PROCEDURE — 700105 HCHG RX REV CODE 258: Performed by: INTERNAL MEDICINE

## 2017-05-08 PROCEDURE — 99232 SBSQ HOSP IP/OBS MODERATE 35: CPT | Performed by: HOSPITALIST

## 2017-05-08 PROCEDURE — 700102 HCHG RX REV CODE 250 W/ 637 OVERRIDE(OP): Performed by: INTERNAL MEDICINE

## 2017-05-08 PROCEDURE — 36415 COLL VENOUS BLD VENIPUNCTURE: CPT

## 2017-05-08 PROCEDURE — 97530 THERAPEUTIC ACTIVITIES: CPT

## 2017-05-08 PROCEDURE — 85007 BL SMEAR W/DIFF WBC COUNT: CPT

## 2017-05-08 PROCEDURE — 82962 GLUCOSE BLOOD TEST: CPT

## 2017-05-08 PROCEDURE — A9270 NON-COVERED ITEM OR SERVICE: HCPCS | Performed by: INTERNAL MEDICINE

## 2017-05-08 PROCEDURE — 93306 TTE W/DOPPLER COMPLETE: CPT | Mod: 26 | Performed by: INTERNAL MEDICINE

## 2017-05-08 PROCEDURE — 700111 HCHG RX REV CODE 636 W/ 250 OVERRIDE (IP): Performed by: INTERNAL MEDICINE

## 2017-05-08 PROCEDURE — 80053 COMPREHEN METABOLIC PANEL: CPT

## 2017-05-08 PROCEDURE — 93306 TTE W/DOPPLER COMPLETE: CPT

## 2017-05-08 PROCEDURE — 85027 COMPLETE CBC AUTOMATED: CPT

## 2017-05-08 RX ORDER — ALBUTEROL SULFATE 90 UG/1
2 AEROSOL, METERED RESPIRATORY (INHALATION) EVERY 4 HOURS
Status: DISCONTINUED | OUTPATIENT
Start: 2017-05-08 | End: 2017-05-09 | Stop reason: HOSPADM

## 2017-05-08 RX ADMIN — STANDARDIZED SENNA CONCENTRATE AND DOCUSATE SODIUM 2 TABLET: 8.6; 5 TABLET, FILM COATED ORAL at 20:26

## 2017-05-08 RX ADMIN — METOPROLOL TARTRATE 50 MG: 50 TABLET, FILM COATED ORAL at 08:13

## 2017-05-08 RX ADMIN — ALBUTEROL SULFATE 2 PUFF: 90 AEROSOL, METERED RESPIRATORY (INHALATION) at 05:50

## 2017-05-08 RX ADMIN — LEVOTHYROXINE SODIUM 25 MCG: 25 TABLET ORAL at 05:50

## 2017-05-08 RX ADMIN — MEMANTINE HYDROCHLORIDE 10 MG: 10 TABLET ORAL at 08:13

## 2017-05-08 RX ADMIN — STANDARDIZED SENNA CONCENTRATE AND DOCUSATE SODIUM 2 TABLET: 8.6; 5 TABLET, FILM COATED ORAL at 08:13

## 2017-05-08 RX ADMIN — SIMVASTATIN 20 MG: 20 TABLET, FILM COATED ORAL at 20:25

## 2017-05-08 RX ADMIN — SODIUM CHLORIDE: 9 INJECTION, SOLUTION INTRAVENOUS at 08:14

## 2017-05-08 RX ADMIN — MEMANTINE HYDROCHLORIDE 10 MG: 10 TABLET ORAL at 20:25

## 2017-05-08 RX ADMIN — DEXAMETHASONE 2 MG: 1 TABLET ORAL at 10:28

## 2017-05-08 RX ADMIN — SODIUM CHLORIDE: 9 INJECTION, SOLUTION INTRAVENOUS at 22:43

## 2017-05-08 RX ADMIN — ALBUTEROL SULFATE 2 PUFF: 90 AEROSOL, METERED RESPIRATORY (INHALATION) at 16:59

## 2017-05-08 RX ADMIN — ENOXAPARIN SODIUM 40 MG: 100 INJECTION SUBCUTANEOUS at 20:25

## 2017-05-08 RX ADMIN — ALBUTEROL SULFATE 2 PUFF: 90 AEROSOL, METERED RESPIRATORY (INHALATION) at 10:30

## 2017-05-08 RX ADMIN — INSULIN LISPRO 1 UNITS: 100 INJECTION, SOLUTION INTRAVENOUS; SUBCUTANEOUS at 20:26

## 2017-05-08 RX ADMIN — ALBUTEROL SULFATE 2 PUFF: 90 AEROSOL, METERED RESPIRATORY (INHALATION) at 20:26

## 2017-05-08 RX ADMIN — INSULIN LISPRO 1 UNITS: 100 INJECTION, SOLUTION INTRAVENOUS; SUBCUTANEOUS at 16:57

## 2017-05-08 ASSESSMENT — PAIN SCALES - GENERAL
PAINLEVEL_OUTOF10: 0

## 2017-05-08 ASSESSMENT — ENCOUNTER SYMPTOMS
SORE THROAT: 0
MEMORY LOSS: 0
MYALGIAS: 0
CONSTIPATION: 0
CLAUDICATION: 0
PHOTOPHOBIA: 0
FEVER: 0
DEPRESSION: 0
STRIDOR: 0
HEADACHES: 0
SENSORY CHANGE: 0
SHORTNESS OF BREATH: 0
SPUTUM PRODUCTION: 0
VOMITING: 0
HEARTBURN: 0
TREMORS: 0
BLOOD IN STOOL: 0
EYE PAIN: 0
BLURRED VISION: 0
COUGH: 0
ORTHOPNEA: 0
HEMOPTYSIS: 0
NERVOUS/ANXIOUS: 0
DOUBLE VISION: 0
SPEECH CHANGE: 0
WEAKNESS: 1
TINGLING: 0
PALPITATIONS: 0
PND: 0
DIZZINESS: 0
BACK PAIN: 0
NAUSEA: 0
NECK PAIN: 0
CHILLS: 0

## 2017-05-08 ASSESSMENT — GAIT ASSESSMENTS
GAIT LEVEL OF ASSIST: CONTACT GUARD ASSIST
ASSISTIVE DEVICE: FRONT WHEEL WALKER
DISTANCE (FEET): 2

## 2017-05-08 NOTE — DISCHARGE PLANNING
Transitional Care Navigator:    Met with patient, then spoke via phone to wife regarding choice for skilled facility.  Per patient's wife, #1 choice is [facility], #2 is [facility].  Choice form completed and faxed; SW aware.

## 2017-05-08 NOTE — THERAPY
"Pt exhibited improved functional mobility today from last PT intervention but still limited. respiratory status limiting pt's tolerance to activity. Pt very cooperative and to do well to progress to higsher level of function with acute PT. Pt amenable to recommendation of transitional care facility prior to DC home wehn medically stable.    Physical Therapy Treatment completed.   Bed Mobility:  Supine to Sit: Moderate Assist  Transfers: Sit to Stand: Contact Guard Assist  Gait: Level Of Assist: Contact Guard Assist with Front-Wheel Walker       Plan of Care: Will benefit from Physical Therapy 3 times per week  Discharge Recommendations: Equipment: Will Continue to Assess for Equipment Needs. Post-acute therapy Discharge to a transitional care facility for continued skilled therapy services.     See \"Rehab Therapy-Acute\" Patient Summary Report for complete documentation.       "

## 2017-05-08 NOTE — PROGRESS NOTES
Assumed care at 0700, bedside report received from NOC shift RN. Patient is AAOX4 with no sign of distress. Initial assessment completed, orders reviewed, call light within reach, bed alarm in use, and hourly rounding in place. POC addressed with patient no additional questions at this time. Patient up working with PT.

## 2017-05-08 NOTE — DISCHARGE PLANNING
WILLIAM received call from [facility] liaison Antionette requesting information on pt's radiation and chemo schedule. Spoke to patient, he reports that his radiation is completed and chemo hasn't started and the plan is unknown at this time. WILLIAM provided this information to Antionette.

## 2017-05-08 NOTE — PROGRESS NOTES
Patient resting comfortably in bed, assistance with q 2 hour turns provided, oxygen req down to 4 L from 6 L. SW working with patient and spouse to get choice for transitional rehab. No additional needs at this time.

## 2017-05-08 NOTE — CARE PLAN
Problem: Communication  Goal: The ability to communicate needs accurately and effectively will improve  Outcome: PROGRESSING AS EXPECTED  POC discussed with pt. All medications explained. All questions answered.     Problem: Mobility  Goal: Risk for activity intolerance will decrease  Outcome: PROGRESSING AS EXPECTED  Pt up to edge of bed and commode.

## 2017-05-08 NOTE — PROGRESS NOTES
Hospital Medicine Progress Note, Adult, Complex               Author: Danish Martinez Date & Time created: 5/8/2017  11:04 AM     Interval History:    77 y.o male with PMHx of metastatic lung cancer, undergoing chemotherapy and radiation, he recently completed 10 days of radiation 7 days ago, admitted for generalized fatigue and shortness of breath on exertion.    Patient reports mild improvement in his symptoms since admission otherwise grossly unchanged. Denies having chest pains, denies shaving fevers/chills or productive cough.   CTA PE: negative , however multiepl lung masses and small pleural effusions.  Continue agerssive RT protocol.  PT/OT  Palliative care consulted.    5/6  No issues overnight,altough patietn does nto complain of shortness of breath he is hypoxic. CTA neg, considering mild pulmonay edema.  Started lasix 20mg , will cont to monitor progress    5/7  No issues overnight, patient feels comfortable however still requiring 6L O2.  CTA PE : neg, Neg consolidations, no fevers. On decadron for metastatic diease  Trial lasix, minimal effect, boderline hypotension  Pulmonary consulted, appreciate recommendations.    5/8  No issues overnight, we were able to wean down o2 from 6-5L. Patient says he is comfortable.   Pulmonary was consulted, no further recommendations at this point.  PT/OT, patient amendable to rehab.    Review of Systems:grossly unchanged.  Review of Systems   Constitutional: Positive for malaise/fatigue. Negative for fever and chills.   HENT: Negative for congestion, hearing loss, sore throat and tinnitus.    Eyes: Negative for blurred vision, double vision, photophobia and pain.   Respiratory: Negative for cough, hemoptysis, sputum production, shortness of breath and stridor.    Cardiovascular: Negative for chest pain, palpitations, orthopnea, claudication and PND.   Gastrointestinal: Negative for heartburn, nausea, vomiting, constipation, blood in stool and melena.    Genitourinary: Negative for dysuria, urgency and frequency.   Musculoskeletal: Negative for myalgias, back pain and neck pain.   Neurological: Positive for weakness. Negative for dizziness, tingling, tremors, sensory change, speech change and headaches.   Psychiatric/Behavioral: Negative for depression, suicidal ideas and memory loss. The patient is not nervous/anxious.        Physical Exam:grossly unchanged.  Physical Exam   Constitutional: He is oriented to person, place, and time. He appears well-nourished. No distress.   HENT:   Head: Normocephalic and atraumatic.   Mouth/Throat: No oropharyngeal exudate.   Eyes: Conjunctivae are normal. Pupils are equal, round, and reactive to light. Right eye exhibits no discharge. No scleral icterus.   Neck: Neck supple. No JVD present. No thyromegaly present.   Cardiovascular: Intact distal pulses.    No murmur heard.  Pulmonary/Chest: No stridor. No respiratory distress. He has no wheezes. He has rales.   diminished breath sounds b/l bases     Abdominal: Soft. Bowel sounds are normal. He exhibits no distension. There is no tenderness. There is no rebound.   Musculoskeletal: Normal range of motion. He exhibits no edema.   Neurological: He is alert and oriented to person, place, and time.   Skin: Skin is warm. He is not diaphoretic. No erythema.   Psychiatric: He has a normal mood and affect. His behavior is normal. Thought content normal.       Labs:        Invalid input(s): [id number]      Recent Labs      05/06/17 0238  05/07/17 0148  05/08/17 0219   SODIUM  138  139  138   POTASSIUM  3.9  3.4*  3.9   CHLORIDE  106  106  107   CO2  23  24  22   BUN  25*  23*  19   CREATININE  0.80  0.78  0.68   MAGNESIUM  1.9  1.9  1.9   CALCIUM  8.3*  8.0*  8.1*     Recent Labs      05/06/17   0238  05/07/17 0148  05/08/17   0219   ALTSGPT  26  21  21   ASTSGOT  27  28  35   ALKPHOSPHAT  68  62  62   TBILIRUBIN  0.9  1.0  0.9   GLUCOSE  149*  125*  104*     Recent Labs       17   0238  17   0148  17   0219   RBC  4.09*  3.63*  3.39*   HEMOGLOBIN  12.4*  10.8*  10.1*   HEMATOCRIT  38.2*  33.9*  31.8*   PLATELETCT  107*  112*  135*     Recent Labs      17   0238  17   0148  17   0219   WBC  5.3  3.8*  3.3*   NEUTSPOLYS  85.90*  86.60*  82.30*   LYMPHOCYTES  8.80*  7.10*  11.50*   MONOCYTES  0.00  0.90  0.00   EOSINOPHILS  0.00  0.00  0.00   BASOPHILS  0.90  0.00  0.00   ASTSGOT  27  28  35   ALTSGPT  26  21  21   ALKPHOSPHAT  68  62  62   TBILIRUBIN  0.9  1.0  0.9           Hemodynamics:  Temp (24hrs), Av.4 °C (97.6 °F), Min:36.1 °C (96.9 °F), Max:37.1 °C (98.7 °F)  Temperature: 36.1 °C (96.9 °F)  Pulse  Av.5  Min: 68  Max: 102   Blood Pressure : 115/72 mmHg     Respiratory:    Respiration: 20, Pulse Oximetry: 92 %     Work Of Breathing / Effort: Moderate  RUL Breath Sounds: Diminished, RML Breath Sounds: Diminished, RLL Breath Sounds: Diminished, MARGY Breath Sounds: Diminished, LLL Breath Sounds: Diminished  Fluids:    Intake/Output Summary (Last 24 hours) at 17 1104  Last data filed at 17 1000   Gross per 24 hour   Intake      0 ml   Output    200 ml   Net   -200 ml     Weight: 109.4 kg (241 lb 2.9 oz)  GI/Nutrition:  Orders Placed This Encounter   Procedures   • Diet Order     Standing Status: Standing      Number of Occurrences: 1      Standing Expiration Date:      Order Specific Question:  Diet:     Answer:  Cardiac [6]     Medical Decision Making, by Problem:  Active Hospital Problems    Diagnosis   • *Acute respiratory failure with hypoxia (CMS-HCC) [J96.01]  - CTA PE: negative , however multiple lung masses and small pleural effusions.  Continue agerssive RT protocol.  - continue incentive spirometry   - trial lasix minimal effect  - Pulmonary consulted no additional recommendations.      • Lung cancer (CMS-HCC) [C34.90]  - with metastatisis, to brain  - will defer to outpatient oncology for continued chemotherapy and  radiation.     • Normocytic anemia [D64.9]   - No signs of gross bleeding, continue to monitor daily cbc for acute changes.     • Thrombocytopenia (CMS-HCC) [D69.6]  - stable overall, minimal improvement.  -  cont to monitor with daily cbc.     • Lactic acidosis [E87.2]  - resolved with fluids.     • Elevated d-dimer [R79.89]  - possibly reactive, CTA PE negative.     • Hyperglycemia [R73.9]  - Continue Insulin-sliding scale, accu-checks and hypoglycemia protocol.     • Brain metastasis (CMS-HCC) [C79.31]  - CT Head: no signfiicant changes from MRI however non specific findings,   - continue decadron.  - given recent radiation, I will hold off on repeating an MRI given time that is needed for therapy changes to occur, will defer to .     • CAD (coronary artery disease) [I25.10]  - continue metoprolol, statin for cardiac protective measures     • HTN (hypertension) [I10]  - controlled, continue metoprolol.     • Immunocompromised (CMS-HCC) [D84.9]  - stable no issues.       Hypokalemia  - resolved    DISPO: PT/OT, placement.    Patient plan of care discussed at multidisplinary team rounds and with patient and R.N at beside.      Labs reviewed, Medications reviewed, Radiology images reviewed and EKG reviewed  Tony catheter: No Tony      DVT Prophylaxis: Enoxaparin (Lovenox)    Ulcer prophylaxis: Not indicated

## 2017-05-08 NOTE — DISCHARGE PLANNING
Received choice form from PATRICIA Dietrich at 1446. Referral sent to [facility] at 1447. Notified  WILLIAM Doan that we will need a SNF order.

## 2017-05-08 NOTE — PROGRESS NOTES
Call received from inpatient , Olimpia with question regarding patient.  Returned call.  She had been searching for information regarding radiation and chemotherapy schedule.    Olimpia reported was able to get information she needed prior to navigator returning call.  Navigator was unaware of cancer recurrence and will re-establish with patient.

## 2017-05-09 ENCOUNTER — HOSPITAL ENCOUNTER (EMERGENCY)
Facility: MEDICAL CENTER | Age: 77
End: 2017-05-09
Payer: MEDICARE

## 2017-05-09 VITALS
TEMPERATURE: 97.7 F | RESPIRATION RATE: 20 BRPM | HEART RATE: 87 BPM | SYSTOLIC BLOOD PRESSURE: 119 MMHG | DIASTOLIC BLOOD PRESSURE: 78 MMHG | OXYGEN SATURATION: 97 %

## 2017-05-09 VITALS
DIASTOLIC BLOOD PRESSURE: 67 MMHG | HEART RATE: 78 BPM | OXYGEN SATURATION: 100 % | WEIGHT: 241.18 LBS | BODY MASS INDEX: 36.55 KG/M2 | HEIGHT: 68 IN | SYSTOLIC BLOOD PRESSURE: 112 MMHG | TEMPERATURE: 96.5 F | RESPIRATION RATE: 24 BRPM

## 2017-05-09 PROBLEM — E87.20 LACTIC ACIDOSIS: Status: RESOLVED | Noted: 2017-05-04 | Resolved: 2017-05-09

## 2017-05-09 PROBLEM — R73.9 HYPERGLYCEMIA: Status: RESOLVED | Noted: 2017-05-04 | Resolved: 2017-05-09

## 2017-05-09 PROBLEM — J96.01 ACUTE RESPIRATORY FAILURE WITH HYPOXIA (HCC): Status: RESOLVED | Noted: 2017-05-04 | Resolved: 2017-05-09

## 2017-05-09 LAB
BACTERIA BLD CULT: NORMAL
BACTERIA BLD CULT: NORMAL
GLUCOSE BLD-MCNC: 128 MG/DL (ref 65–99)
GLUCOSE BLD-MCNC: 139 MG/DL (ref 65–99)
SIGNIFICANT IND 70042: NORMAL
SIGNIFICANT IND 70042: NORMAL
SITE SITE: NORMAL
SITE SITE: NORMAL
SOURCE SOURCE: NORMAL
SOURCE SOURCE: NORMAL

## 2017-05-09 PROCEDURE — 97535 SELF CARE MNGMENT TRAINING: CPT

## 2017-05-09 PROCEDURE — 82962 GLUCOSE BLOOD TEST: CPT

## 2017-05-09 PROCEDURE — 99283 EMERGENCY DEPT VISIT LOW MDM: CPT

## 2017-05-09 PROCEDURE — 97530 THERAPEUTIC ACTIVITIES: CPT

## 2017-05-09 PROCEDURE — A9270 NON-COVERED ITEM OR SERVICE: HCPCS | Performed by: INTERNAL MEDICINE

## 2017-05-09 PROCEDURE — 700102 HCHG RX REV CODE 250 W/ 637 OVERRIDE(OP): Performed by: INTERNAL MEDICINE

## 2017-05-09 PROCEDURE — 99239 HOSP IP/OBS DSCHRG MGMT >30: CPT | Performed by: HOSPITALIST

## 2017-05-09 RX ORDER — ALBUTEROL SULFATE 90 UG/1
2 AEROSOL, METERED RESPIRATORY (INHALATION) EVERY 4 HOURS
Qty: 8.5 G
Start: 2017-05-09

## 2017-05-09 RX ADMIN — LEVOTHYROXINE SODIUM 25 MCG: 25 TABLET ORAL at 06:21

## 2017-05-09 RX ADMIN — METOPROLOL TARTRATE 50 MG: 50 TABLET, FILM COATED ORAL at 10:07

## 2017-05-09 RX ADMIN — DEXAMETHASONE 2 MG: 1 TABLET ORAL at 10:07

## 2017-05-09 RX ADMIN — ALBUTEROL SULFATE 2 PUFF: 90 AEROSOL, METERED RESPIRATORY (INHALATION) at 02:25

## 2017-05-09 RX ADMIN — MEMANTINE HYDROCHLORIDE 10 MG: 10 TABLET ORAL at 10:07

## 2017-05-09 RX ADMIN — ALBUTEROL SULFATE 2 PUFF: 90 AEROSOL, METERED RESPIRATORY (INHALATION) at 06:21

## 2017-05-09 RX ADMIN — ALBUTEROL SULFATE 2 PUFF: 90 AEROSOL, METERED RESPIRATORY (INHALATION) at 10:07

## 2017-05-09 ASSESSMENT — PAIN SCALES - GENERAL: PAINLEVEL_OUTOF10: 0

## 2017-05-09 NOTE — PROGRESS NOTES
"Pulmonary Critical Care Progress Note        Chief Complaint: shortness of breath.    History of Present Illness: 77 y.o. male  with a history of poorly differentiated bronchogenic adenocarcinoma and COPD who presented with shortness of breath and  weakness.     The patient says he is currently undergoing chemotherapy and radiation for lung  cancer.  He had his last chemotherapy on Monday and recently completed 10 days of whole brain  radiation 7 days ago.  About 7 days ago, he noticed that he began feeling  generally weak.  He would become very out of breath with minimal exertion.  He had some cough. With \"chest congestion not able to cough it up\" .  He has not had any fever.  He has had some  constipation and denies any diarrhea.   He has been so weak.  He is unable to walk as he said his knees and ankles feel like they are going to buckle.  He is not  dizzy and does not feel like the room is spinning.  He denies any focal  weakness of extremities, but feels generally weak to the point where it was  hard to able to get out of the bed.    ROS:  Respiratory: positive shortness of breath, but better, Cardiac: negative chest pain, GI: negative abdominal pain.  All other systems negative.    Interval Events:  24 hour interval history reviewed.      PFSH:  No change.    Respiratory:     Pulse Oximetry: 98 % on oxygen at 6 liters/minute,  Exam: Diminished but symmetrical bilateral breath sounds with a few basilar rales and no wheezing or egophony.  ImagingAvailable data reviewed.Chest radiogram on May 4, 2017 with low lung volumes and some bibasilar density.  No film today.     HemoDynamics:  Pulse: 77  Blood Pressure : 122/82 mmHg     Exam: regular rate and rhythm  Imaging: Available data reviewed. Echocardiogram with preserved left ventricular systolic function, estimated systolic ejection fraction of 55%, and mild diastolic dysfunction.        Neuro:  Exam: no focal deficits noted mental status intact  Imaging: " Available data reviewed. CT brain scan on May 4, 2017.    Fluids:  Intake/Output       17 07 - 17 0659 17 07 - 17 0659 17 - 17 0659      [id number] 5076-7708 Total 1900-0659 Total 1900-0659 Total       Intake    Total Intake -- -- -- -- -- -- -- -- --       Output    Urine  1000  -- 1000  --  -- --  200  -- 200    Number of Times Voided -- -- -- 1 x -- 1 x -- -- --    Void (ml) 1000 -- 1000 -- -- -- 200 -- 200    Stool  --  -- --  --  -- --  --  -- --    Number of Times Stooled -- -- -- 1 x -- 1 x -- -- --    Total Output 1000 -- 1000 -- -- -- 200 -- 200       Net I/O     -1000 -- -1000 -- -- -- -200 -- -200        Weight: 109.4 kg (241 lb 2.9 oz)  Recent Labs      17   SODIUM  138  139  138   POTASSIUM  3.9  3.4*  3.9   CHLORIDE  106  106  107   CO2  23  24  22   BUN  25*  23*  19   CREATININE  0.80  0.78  0.68   MAGNESIUM  1.9  1.9  1.9   CALCIUM  8.3*  8.0*  8.1*       GI/Nutrition:  Exam: abdomen is soft and non-tender  Imaging: Available data reviewed  taking PO  Liver Function  Recent Labs      17   ALTSGPT  26  21  21   ASTSGOT  27  28  35   ALKPHOSPHAT  68  62  62   TBILIRUBIN  0.9  1.0  0.9   GLUCOSE  149*  125*  104*       Heme:  Recent Labs      17   RBC  4.09*  3.63*  3.39*   HEMOGLOBIN  12.4*  10.8*  10.1*   HEMATOCRIT  38.2*  33.9*  31.8*   PLATELETCT  107*  112*  135*       Infectious Disease:  Temp  Av.4 °C (97.5 °F)  Min: 35.8 °C (96.5 °F)  Max: 37.1 °C (98.7 °F)  Micro: no new positive cultures  Recent Labs      17   0238  17   0148  17   0219   WBC  5.3  3.8*  3.3*   NEUTSPOLYS  85.90*  86.60*  82.30*   LYMPHOCYTES  8.80*  7.10*  11.50*   MONOCYTES  0.00  0.90  0.00   EOSINOPHILS  0.00  0.00  0.00   BASOPHILS  0.90  0.00  0.00   ASTSGOT  27  28  35   ALTSGPT  26  21  21    ALKPHOSPHAT  68  62  62   TBILIRUBIN  0.9  1.0  0.9     Current Facility-Administered Medications   Medication Dose Frequency Provider Last Rate Last Dose   • albuterol inhaler 2 Puff  2 Puff Q4HRS JOSH PortilloOShaji   2 Puff at 05/08/17 1659   • acetaminophen (TYLENOL) tablet 650 mg  650 mg Q6HRS PRN Danish Martinez M.D.   650 mg at 05/05/17 0859   • dexamethasone (DECADRON) tablet 2 mg  2 mg DAILY JOSH PortilloO.   2 mg at 05/08/17 1028   • levothyroxine (SYNTHROID) tablet 25 mcg  25 mcg AM ES ZACHARY Portillo.O.   25 mcg at 05/08/17 0550   • memantine (NAMENDA) tablet 10 mg  10 mg BID JOSH PortilloO.   10 mg at 05/08/17 0813   • metoprolol (LOPRESSOR) tablet 50 mg  50 mg BID JOSH PortilloO.   50 mg at 05/08/17 0813   • simvastatin (ZOCOR) tablet 20 mg  20 mg Nightly JOSH PortilloO.   20 mg at 05/07/17 2005   • senna-docusate (PERICOLACE or SENOKOT S) 8.6-50 MG per tablet 2 Tab  2 Tab BID JOSH PortilloO.   2 Tab at 05/08/17 0813    And   • polyethylene glycol/lytes (MIRALAX) PACKET 1 Packet  1 Packet QDAY PRN Tatianna Diaz D.O.        And   • magnesium hydroxide (MILK OF MAGNESIA) suspension 30 mL  30 mL QDAY PRN Tatianna Diaz D.O.        And   • bisacodyl (DULCOLAX) suppository 10 mg  10 mg QDAY PRN Tatianna Diaz D.O.       • Respiratory Care per Protocol   Continuous RT Tatianna Diaz D.O.       • NS infusion   Continuous Tatianna Diaz D.O. 75 mL/hr at 05/08/17 0814     • enoxaparin (LOVENOX) inj 40 mg  40 mg QHS JOSH PortilloO.   40 mg at 05/07/17 2004   • ondansetron (ZOFRAN) syringe/vial injection 4 mg  4 mg Q4HRS PRN JOSH PortilloO.       • ondansetron (ZOFRAN ODT) dispertab 4 mg  4 mg Q4HRS PRN JOSH PortilloO.       • guaifenesin DM (ROBITUSSIN DM) 100-10 MG/5ML syrup 10 mL  10 mL Q6HRS PRN JOSH PortilloO.       • insulin lispro (HUMALOG) injection 1-6 Units  1-6 Units 4X/DAY ACHS JOSH PortilloO.   1 Units  at 05/08/17 1657   • glucose 4 g chewable tablet 16 g  16 g Q15 MIN PRN Tatianna Diaz D.O.        And   • dextrose 50% (D50W) injection 25 mL  25 mL Q15 MIN PRN Tatianna Diaz D.O.         Last reviewed on 5/4/2017  3:56 PM by Jimena Felix    Quality  Measures:  Core Measures & Quality Metrics    Problems:  1.  Acute hypoxemic respiratory failure.  This is probably related to several factors including chronic obstructive pulmonary disease, bronchogenic adenocarcinoma and the systemic effects of chemotherapy and radiation therapy.  Interstitial lung disease related to the chemotherapy would not likely present so soon after initiation of therapy.  He received whole brain irradiation but apparently no radiation to the chest.  The CT chest angiogram did not suggest thromboembolic disease.  There is little to suggest pulmonary edema and systolic cardiac function seems preserved by echocardiography.  2.  Bronchogenic adenocarcinoma with metastasis to brain..  Treatment per oncology and radiation oncology.  3.  Coronary artery disease with preserved left ventricular systolic function.  4.  Systemic arterial hypertension, controlled with mild chronic diastolic cardiomyopathy.  5.  Anemia, apparently chronic without evident acute blood loss.  6.  Thrombocytopenia.    Recommend:  1.  Continue titrated supplemental oxygen, bronchodilators and respiratory protocols.  2.  Continue blood pressure control, mobilization and prophylaxis.  3.  Monitor blood counts.  4.  Discharge planning in progress.    Discussed with patient.

## 2017-05-09 NOTE — DISCHARGE PLANNING
Received call from Antionette who will call back with a definite transport time. Notified [facility] via phone.

## 2017-05-09 NOTE — DISCHARGE PLANNING
Received call from Antionette with [facility], they will pickup patient at 1430. Notified [facility] via phone.

## 2017-05-09 NOTE — PROGRESS NOTES
" Pulmonary Critical Care Progress Note        Chief Complaint: shortness of breath.    History of Present Illness: 77 y.o. male  with a history of poorly differentiated bronchogenic adenocarcinoma and COPD who presented with shortness of breath and  weakness.     The patient says he is currently undergoing chemotherapy and radiation for lung  cancer.  He had his last chemotherapy on Monday and recently completed 10 days of whole brain  radiation 7 days ago.  About 7 days ago, he noticed that he began feeling  generally weak.  He would become very out of breath with minimal exertion.  He had some cough. With \"chest congestion not able to cough it up\" .  He has not had any fever.  He has had some  constipation and denies any diarrhea.   He has been so weak.  He is unable to walk as he said his knees and ankles feel like they are going to buckle.  He is not  dizzy and does not feel like the room is spinning.  He denies any focal  weakness of extremities, but feels generally weak to the point where it was  hard to able to get out of the bed.    ROS:  Respiratory: positive shortness of breath, but better, Cardiac: negative chest pain, GI: negative abdominal pain.  All other systems negative.    Interval Events:  24 hour interval history reviewed.  Comfortable on oxygen at 6 liters/minute. No new issues.    PFSH:  No change.    Respiratory:     Pulse Oximetry: 100 % on oxygen at 6 liters/minute,  Exam: Diminished but symmetrical bilateral breath sounds with a few basilar rales and no wheezing or egophony.  ImagingAvailable data reviewed.Chest radiogram on May 4, 2017 with low lung volumes and some bibasilar density.  No film today.     HemoDynamics:  Pulse: 78  Blood Pressure : 112/67 mmHg     Exam: regular rate and rhythm  Imaging: Available data reviewed. Echocardiogram with preserved left ventricular systolic function, estimated systolic ejection fraction of 55%, and mild diastolic dysfunction.        Neuro:  Exam: no " focal deficits noted mental status intact  Imaging: Available data reviewed. CT brain scan on May 4, 2017.    Fluids:  Intake/Output       17 07 - 17 0659 17 07 - 17 0659 17 07 - 05/10/17 0659      [id number] 7840-9210 Total 8162-5977 1421-3226 Total 0459-7067 7287-4696 Total       Intake    P.O.  --  -- --  --  50 50  --  -- --    P.O. -- -- -- -- 50 50 -- -- --    Total Intake -- -- -- -- 50 50 -- -- --       Output    Urine  --  -- --  200  500 700  --  -- --    Number of Times Voided 1 x -- 1 x -- -- -- -- -- --    Void (ml) -- -- -- 200 500 700 -- -- --    Stool  --  -- --  --  -- --  --  -- --    Number of Times Stooled 1 x -- 1 x -- -- -- -- -- --    Total Output -- -- -- 200 500 700 -- -- --       Net I/O     -- -- -- -200 -450 -650 -- -- --           Recent Labs      17   SODIUM  139  138   POTASSIUM  3.4*  3.9   CHLORIDE  106  107   CO2  24  22   BUN  23*  19   CREATININE  0.78  0.68   MAGNESIUM  1.9  1.9   CALCIUM  8.0*  8.1*       GI/Nutrition:  Exam: abdomen is soft and non-tender  Imaging: Available data reviewed  taking PO  Liver Function  Recent Labs      17   ALTSGPT  21  21   ASTSGOT  28  35   ALKPHOSPHAT  62  62   TBILIRUBIN  1.0  0.9   GLUCOSE  125*  104*       Heme:  Recent Labs      17   RBC  3.63*  3.39*   HEMOGLOBIN  10.8*  10.1*   HEMATOCRIT  33.9*  31.8*   PLATELETCT  112*  135*       Infectious Disease:  Temp  Av.2 °C (97.1 °F)  Min: 35.8 °C (96.5 °F)  Max: 36.6 °C (97.8 °F)  Micro: no new positive cultures  Recent Labs      17   0148  17   0219   WBC  3.8*  3.3*   NEUTSPOLYS  86.60*  82.30*   LYMPHOCYTES  7.10*  11.50*   MONOCYTES  0.90  0.00   EOSINOPHILS  0.00  0.00   BASOPHILS  0.00  0.00   ASTSGOT  28  35   ALTSGPT  21  21   ALKPHOSPHAT  62  62   TBILIRUBIN  1.0  0.9     Current Facility-Administered Medications   Medication Dose Frequency  Provider Last Rate Last Dose   • albuterol inhaler 2 Puff  2 Puff Q4HRS JOSH PortilloOShaji   2 Puff at 05/09/17 1007   • acetaminophen (TYLENOL) tablet 650 mg  650 mg Q6HRS PRN Danish Martinez M.D.   650 mg at 05/05/17 0859   • dexamethasone (DECADRON) tablet 2 mg  2 mg DAILY JOSH PortilloOShaji   2 mg at 05/09/17 1007   • levothyroxine (SYNTHROID) tablet 25 mcg  25 mcg AM ES ZACHARY Portillo.O.   25 mcg at 05/09/17 0621   • memantine (NAMENDA) tablet 10 mg  10 mg BID JOSH PortilloO.   10 mg at 05/09/17 1007   • metoprolol (LOPRESSOR) tablet 50 mg  50 mg BID JOSH PortilloO.   50 mg at 05/09/17 1007   • simvastatin (ZOCOR) tablet 20 mg  20 mg Nightly JOSH PortilloO.   20 mg at 05/08/17 2025   • senna-docusate (PERICOLACE or SENOKOT S) 8.6-50 MG per tablet 2 Tab  2 Tab BID Tatianna Diaz D.O.   Stopped at 05/09/17 1007    And   • polyethylene glycol/lytes (MIRALAX) PACKET 1 Packet  1 Packet QDAY PRN Tatianna Diaz D.O.        And   • magnesium hydroxide (MILK OF MAGNESIA) suspension 30 mL  30 mL QDAY PRN Tatianna Diaz D.O.        And   • bisacodyl (DULCOLAX) suppository 10 mg  10 mg QDAY PRN Tatianna Diaz D.O.       • Respiratory Care per Protocol   Continuous RT Tatianna Diaz D.O.       • NS infusion   Continuous JOSH PortilloO. 75 mL/hr at 05/08/17 2243     • enoxaparin (LOVENOX) inj 40 mg  40 mg QHS JOSH PortilloO.   40 mg at 05/08/17 2025   • ondansetron (ZOFRAN) syringe/vial injection 4 mg  4 mg Q4HRS PRN Tatianna M Lewman, D.O.       • ondansetron (ZOFRAN ODT) dispertab 4 mg  4 mg Q4HRS PRN Tatianna Diaz D.O.       • guaifenesin DM (ROBITUSSIN DM) 100-10 MG/5ML syrup 10 mL  10 mL Q6HRS PRN JOSH PortilloO.       • insulin lispro (HUMALOG) injection 1-6 Units  1-6 Units 4X/DAY ACHS JOSH PortilloO.   Stopped at 05/09/17 0700   • glucose 4 g chewable tablet 16 g  16 g Q15 MIN PRN JOSH PortilloO.        And   • dextrose 50%  (D50W) injection 25 mL  25 mL Q15 MIN PRN Tatianna Diaz D.O.         Last reviewed on 5/4/2017  3:56 PM by Jimena Felix    Quality  Measures:  Core Measures & Quality Metrics    Problems:  1.  Acute hypoxemic respiratory failure.  This is probably related to several factors including chronic obstructive pulmonary disease, bronchogenic adenocarcinoma and the systemic effects of chemotherapy and radiation therapy.  Interstitial lung disease related to the chemotherapy would not likely present so soon after initiation of therapy.  He received whole brain irradiation but apparently no radiation to the chest.  The CT chest angiogram did not suggest thromboembolic disease.  There is little to suggest pulmonary edema and systolic cardiac function seems preserved by echocardiography.  2.  Bronchogenic adenocarcinoma with metastasis to brain. Treatment per oncology and radiation oncology.  3.  Coronary artery disease with preserved left ventricular systolic function.  4.  Systemic arterial hypertension, controlled with mild chronic diastolic cardiomyopathy.  5.  Anemia, apparently chronic without evident acute blood loss.  6.  Thrombocytopenia.    Recommend:  1.  Continue titrated supplemental oxygen, bronchodilators and respiratory protocols.  2.  Continue blood pressure control, mobilization and prophylaxis.  3.  Monitor blood counts.  4.  Discharge planning in progress. For transfer to [facility] today.    Discussed with patient.

## 2017-05-09 NOTE — DISCHARGE PLANNING
SW notified pt and bedside RN of 2:30 transfer to [facility]. Transfer packet and cobra placed with pt's chart.

## 2017-05-09 NOTE — THERAPY
"Occupational Therapy Treatment completed with focus on ADLs, ADL transfers and patient education.  Functional Status:  Pt seen for OT tx, performed UB dressing with min a, STS from recliner with min a initially and with fatigue required mod a, LB mod/max a, urinal use standing with min a, able to manage clothing over hips, once positioned able to hold in place, h/g standing sink side required intermittent restbreaks during task due to increase in physical exertion, ambulated ~10ft with cga using FWW. Pt continues to be limited by activity endurance, increased sob with prolonged participation with ADLs, increased O2 needs from baseline and generalized weakness. Pt would continue to benefit from acute skilled services while in house.   Plan of Care: Will benefit from Occupational Therapy 3 times per week  Discharge Recommendations:  Equipment Will Continue to Assess for Equipment Needs. Post-acute therapy Discharge to a transitional care facility for continued skilled therapy services.    See \"Rehab Therapy-Acute\" Patient Summary Report for complete documentation.   "

## 2017-05-09 NOTE — DISCHARGE SUMMARY
CHIEF COMPLAINT ON ADMISSION  Chief Complaint   Patient presents with   • Tired     Pt reports increasing fatigue over the past week.  Pt has history of lung cancer with metastasis to the brain and liver.  Pt has been getting radiation and chemotherapy.  Pt's oncologist is MD Gomez.        CODE STATUS  DNR    HPI & HOSPITAL COURSE  This is a 77 y.o. year old male here with weakness and metastatic lung cancer to the brain. He was admitted for shortness of breath on exertion and weakness. He was continued on decadron and gradually down-titrated on his oxygen requirements. He was evaluated for post-acute needs and felt to need skilled nursing. Therefore, this was arranged. Therefore, he is discharged in guarded and stable condition for further post-acute management.     SPECIFIC OUTPATIENT FOLLOW-UP  Consider hospice    DISCHARGE PROBLEM LIST  Principal Problem (Resolved):    Acute respiratory failure with hypoxia (CMS-HCC) POA: Yes  Active Problems:    Lung cancer (CMS-HCC) POA: Yes    Immunocompromised (CMS-HCC) POA: Yes    CAD (coronary artery disease) POA: Yes    HTN (hypertension) POA: Yes    Brain metastasis (CMS-HCC) POA: Yes      Overview: Lung primary    Normocytic anemia POA: Yes    Thrombocytopenia (CMS-HCC) POA: Yes  Resolved Problems:    Lactic acidosis POA: Yes    Hyperglycemia POA: Yes      FOLLOW UP  Future Appointments  Date Time Provider Department Center   5/10/2017 8:00 AM LAB SKILLED NURSING L[facility] None   6/13/2017 1:40 PM Albino Fletcher A.P.N. [address]   6/16/2017 1:15 PM [address]   6/20/2017 3:30 PM Justus KWOK M.D. RADT None     No follow-up provider specified.    MEDICATIONS ON DISCHARGE   [place] Sonny Serg   [place] Medication Instructions ALANIS:[id number]    Printed on:05/09/17 1336   Medication Information                      albuterol 108 (90 BASE) MCG/ACT Aero Soln inhalation aerosol  Inhale 2 Puffs by mouth every 4 hours.             dexamethasone  (DECADRON) 2 MG tablet  Take 2 mg by mouth every day. Decreasing dose, unsure of what the dose is today.             levothyroxine (SYNTHROID) 25 MCG Tab  Take 25 mcg by mouth Every morning on an empty stomach.             memantine (NAMENDA) 10 MG Tab  Take 1 Tab by mouth 2 times a day.             metoprolol (LOPRESSOR) 50 MG TABS  Take 50 mg by mouth 2 times a day.                 DIET  Orders Placed This Encounter   Procedures   • Diet Order     Standing Status: Standing      Number of Occurrences: 1      Standing Expiration Date:      Order Specific Question:  Diet:     Answer:  Cardiac [6]       ACTIVITY  As tolerated and directed by skilled nursing.    LINES, DRAINS, AND WOUNDS  This is an automated list. Peripheral IVs will be removed prior to discharge.  PIV Group Right Forearm 20g Flexible Catheter (Active)   Line Secured Taped;Transparent 5/8/2017  8:15 PM   Site Condition / Description Assessed;Patent;Clean;Dry;Intact 5/8/2017  8:15 PM   Dressing Type / Description Transparent;Tegaderm Advanced;Clean;Dry;Intact 5/8/2017  8:15 PM   Dressing Status Observed 5/8/2017  8:15 PM   Saline Locked Yes 5/7/2017  5:00 AM   Infiltration Grading Used by [facility] 0 5/8/2017  8:15 PM   Phlebitis Scale (Used by [facility]) 0 5/8/2017  8:15 PM   Date Primary Tubing Changed 05/04/17 5/7/2017  5:00 AM   NEXT Primary Tubing Change  05/09/17 5/8/2017  8:15 PM       Surgical Incision  Incision Left Head (Active)       Surgical Incision  Incision Left Chest (Active)                  MENTAL STATUS ON TRANSFER  Level of Consciousness: Alert  Orientation : Disoriented to Time  Speech: Speech Clear    CONSULTATIONS  Jan Peralta MD - pulmonology    PROCEDURES  None    LABORATORY  Lab Results   Component Value Date/Time    SODIUM 138 05/08/2017 02:19 AM    POTASSIUM 3.9 05/08/2017 02:19 AM    CHLORIDE 107 05/08/2017 02:19 AM    CO2 22 05/08/2017 02:19 AM    GLUCOSE 104* 05/08/2017 02:19 AM    BUN 19 05/08/2017 02:19 AM     CREATININE 0.68 05/08/2017 02:19 AM        Lab Results   Component Value Date/Time    WBC 3.3* 05/08/2017 02:19 AM    HEMOGLOBIN 10.1* 05/08/2017 02:19 AM    HEMATOCRIT 31.8* 05/08/2017 02:19 AM    PLATELET COUNT 135* 05/08/2017 02:19 AM        Total time of the discharge process exceeds 40 minutes.

## 2017-05-09 NOTE — DISCHARGE PLANNING
Transitional Care Navigator:    Met with patient and his wife regarding [facility] and that [facility] does not accept the patient's secondary insurance.  After discussion of other options, and speaking with admitting at [facility],  And Mrs. Carter wish to stay with [facility] for his post acute needs.  They understand that if he requires more than 20 days of treatment, [facility] will work with them on potentially transferring to another facility.

## 2017-05-09 NOTE — DISCHARGE PLANNING
Received call from [facility] to see if [facility] will take patient today. Contacted [facility] left message for a call back.

## 2017-05-09 NOTE — DISCHARGE PLANNING
Received call from Antionette with [facility] there will be a co-pay of $164.50 after 20 days.  [facility] will pickup at 1600. Notified [facility] via phone. Notified Antionette via phone.

## 2017-05-09 NOTE — PROGRESS NOTES
Assumed care of patient bedside report received from BRENDA Joy updated on POC, call light within reach and fall precautions in place. Bed locked and in lowest position. Pt is AOx4 on 5L Mask; mepilex on ears for prevention. Patient instructed to call for assistance before getting out of bed. All questions answered, no other needs at this time.

## 2017-05-09 NOTE — CARE PLAN
Problem: Safety  Goal: Will remain free from injury  Outcome: PROGRESSING AS EXPECTED  Bedside table and call light are within reach. Bed is locked and in the lowest position. Treaded socks are on. Patient educated to call for assistance.         Problem: Knowledge Deficit  Goal: Knowledge of disease process/condition, treatment plan, diagnostic tests, and medications will improve  Outcome: PROGRESSING AS EXPECTED  Updated pt on POC and medications.

## 2017-05-09 NOTE — DISCHARGE INSTRUCTIONS
Discharge Instructions    Discharged to other by [facility] with escort. Discharged via wheelchair, hospital escort: Yes.  Special equipment needed: Cane, Oxygen and Wheelchair    Be sure to schedule a follow-up appointment with your primary care doctor or any specialists as instructed.     Discharge Plan:   Influenza Vaccine Indication: Patient Refuses    I understand that a diet low in cholesterol, fat, and sodium is recommended for good health. Unless I have been given specific instructions below for another diet, I accept this instruction as my diet prescription.   Other diet: Cardiac    Special Instructions: None    · Is patient discharged on Warfarin / Coumadin?   No     · Is patient Post Blood Transfusion?  No    Depression / Suicide Risk    As you are discharged from this [facility], it is important to learn how to keep safe from harming yourself.    Recognize the warning signs:  · Abrupt changes in personality, positive or negative- including increase in energy   · Giving away possessions  · Change in eating patterns- significant weight changes-  positive or negative  · Change in sleeping patterns- unable to sleep or sleeping all the time   · Unwillingness or inability to communicate  · Depression  · Unusual sadness, discouragement and loneliness  · Talk of wanting to die  · Neglect of personal appearance   · Rebelliousness- reckless behavior  · Withdrawal from people/activities they love  · Confusion- inability to concentrate     If you or a loved one observes any of these behaviors or has concerns about self-harm, here's what you can do:  · Talk about it- your feelings and reasons for harming yourself  · Remove any means that you might use to hurt yourself (examples: pills, rope, extension cords, firearm)  · Get professional help from the community (Mental Health, Substance Abuse, psychological counseling)  · Do not be alone:Call your Safe Contact- someone whom you trust who will be there for  you.  · Call your local CRISIS HOTLINE 386-8342 or 940-916-2846  · Call your local Children's Mobile Crisis Response Team [place] (262) 733-1178 or www.[employer]  · Call the toll free National Suicide Prevention Hotlines   · National Suicide Prevention Lifeline 708-329-QJUX (2838)  · National [employer] Line Network 800-SUICIDE (142-8746)

## 2017-05-09 NOTE — PROGRESS NOTES
"Was notified regarding patient from .  Met with patient and reintroduced self.  Pt talked about being comfortable with plan of care and does not have any questions right now for navigator.  He states he has good relationship with his oncologist, Dr Gmoez.  When asked patient reporting 7/10 on oncology distress screening tool.  He states communication and \"waiting\" for simple things cause for this score.  He did provide information that number changes on daily basis depending on what is going on.  He could not identify any barrier to care outside of communication.  Encouraged him to contact navigator if future concerns regarding his cancer care and communication come up.  Currently he does not feel this is an issue.  Business card provided to patient.  "

## 2017-05-10 ENCOUNTER — HOSPITAL ENCOUNTER (OUTPATIENT)
Dept: LAB | Facility: MEDICAL CENTER | Age: 77
End: 2017-05-10
Attending: INTERNAL MEDICINE
Payer: COMMERCIAL

## 2017-05-10 LAB
ANION GAP SERPL CALC-SCNC: 8 MMOL/L (ref 0–11.9)
ANISOCYTOSIS BLD QL SMEAR: ABNORMAL
BASOPHILS # BLD AUTO: 0 % (ref 0–1.8)
BASOPHILS # BLD: 0 K/UL (ref 0–0.12)
BUN SERPL-MCNC: 18 MG/DL (ref 8–22)
CALCIUM SERPL-MCNC: 8.8 MG/DL (ref 8.5–10.5)
CHLORIDE SERPL-SCNC: 103 MMOL/L (ref 96–112)
CO2 SERPL-SCNC: 27 MMOL/L (ref 20–33)
CREAT SERPL-MCNC: 0.82 MG/DL (ref 0.5–1.4)
EOSINOPHIL # BLD AUTO: 0.14 K/UL (ref 0–0.51)
EOSINOPHIL NFR BLD: 4 % (ref 0–6.9)
ERYTHROCYTE [DISTWIDTH] IN BLOOD BY AUTOMATED COUNT: 74.4 FL (ref 35.9–50)
GFR SERPL CREATININE-BSD FRML MDRD: >60 ML/MIN/1.73 M 2
GLUCOSE SERPL-MCNC: 92 MG/DL (ref 65–99)
HCT VFR BLD AUTO: 38 % (ref 42–52)
HGB BLD-MCNC: 11.7 G/DL (ref 14–18)
LYMPHOCYTES # BLD AUTO: 0.88 K/UL (ref 1–4.8)
LYMPHOCYTES NFR BLD: 25 % (ref 22–41)
MANUAL DIFF BLD: NORMAL
MCH RBC QN AUTO: 30.2 PG (ref 27–33)
MCHC RBC AUTO-ENTMCNC: 30.8 G/DL (ref 33.7–35.3)
MCV RBC AUTO: 97.9 FL (ref 81.4–97.8)
METAMYELOCYTES NFR BLD MANUAL: 2 %
MICROCYTES BLD QL SMEAR: ABNORMAL
MONOCYTES # BLD AUTO: 0.18 K/UL (ref 0–0.85)
MONOCYTES NFR BLD AUTO: 5 % (ref 0–13.4)
MORPHOLOGY BLD-IMP: NORMAL
NEUTROPHILS # BLD AUTO: 2.24 K/UL (ref 1.82–7.42)
NEUTROPHILS NFR BLD: 61 % (ref 44–72)
NEUTS BAND NFR BLD MANUAL: 3 % (ref 0–10)
NRBC # BLD AUTO: 0.05 K/UL
NRBC BLD AUTO-RTO: 1.4 /100 WBC
OVALOCYTES BLD QL SMEAR: NORMAL
PLATELET # BLD AUTO: 190 K/UL (ref 164–446)
PMV BLD AUTO: 11.2 FL (ref 9–12.9)
POIKILOCYTOSIS BLD QL SMEAR: NORMAL
POTASSIUM SERPL-SCNC: 4 MMOL/L (ref 3.6–5.5)
RBC # BLD AUTO: 3.88 M/UL (ref 4.7–6.1)
RBC BLD AUTO: PRESENT
SODIUM SERPL-SCNC: 138 MMOL/L (ref 135–145)
WBC # BLD AUTO: 3.5 K/UL (ref 4.8–10.8)

## 2017-05-10 NOTE — DISCHARGE PLANNING
ER Note- 1915    Pt return to [facility] from [facility] because he stated he did not want to be there. Dr. Cano spoke with MD at [facility] who agreed to take pt back. Dr. Cano spoke with pt who agreed to return and discuss other options tomorrow. WILLIAM spoke with pt who agreed to return to [facility]. Arranged [facility] to return pt to [facility].

## 2017-05-10 NOTE — ED NOTES
Pt was sent by [facility] because pt was refusing care at [facility].  Pt was evaluated by Dr Cano, and returned to [facility] via [facility].  Pt agreed to return.

## 2017-05-10 NOTE — PROGRESS NOTES
Patient sent by my partner Dr. Cast. I evaluated the patient in the ER. He has no new acute medical issues, simply did not want to stay the night in the skilled nursing facility. I talked with the patient and he is now agreeable to stay in the skilled nursing facility as long as social work there tomorrow works on a new plan for him.     He is stable for discharge back to [facility]. He is of sound mind and able to make decisions on his own at the time of my evaluation.    I discussed the case with Dr. Cast.     Dispo: discharge to skilled nursing in stable condition. For further details please refer to my prior discharge summary from earlier today.

## 2017-05-10 NOTE — ED NOTES
Discharge and RX instructions given, pt provided verbal understanding. Discharge assessment complete vital signs within normal limits. Pt agree they are ready for discharge.  All questions were answered.  Pt agrees to follow instructions.

## 2017-05-11 ENCOUNTER — APPOINTMENT (OUTPATIENT)
Dept: RADIOLOGY | Facility: IMAGING CENTER | Age: 77
End: 2017-05-11
Attending: INTERNAL MEDICINE
Payer: MEDICARE
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
## 2017-05-18 ENCOUNTER — APPOINTMENT (OUTPATIENT)
Dept: RADIOLOGY | Facility: IMAGING CENTER | Age: 77
End: 2017-05-18
Attending: INTERNAL MEDICINE
Payer: MEDICARE

## 2017-05-18 DIAGNOSIS — R52 PAIN: ICD-10-CM

## 2017-05-22 ENCOUNTER — PATIENT OUTREACH (OUTPATIENT)
Dept: HEALTH INFORMATION MANAGEMENT | Facility: OTHER | Age: 77
End: 2017-05-22

## 2017-05-22 NOTE — PROGRESS NOTES
· Chart reviewed.  Patient was discharged from [facility] to [facility] on 5/19/17.  Patient does not qualify for discharge outreach phone call at this time.

## 2017-06-09 ENCOUNTER — TELEPHONE (OUTPATIENT)
Dept: MEDICAL GROUP | Facility: MEDICAL CENTER | Age: 77
End: 2017-06-09

## 2017-06-09 RX ORDER — SIMVASTATIN 20 MG
20 TABLET ORAL NIGHTLY
COMMUNITY

## 2017-06-09 NOTE — TELEPHONE ENCOUNTER
Future Appointments       Provider Department Center    6/16/2017 1:30 PM [facility] [address]    6/20/2017 11:00 AM EPIFANIO Ramos [facility] [address]    6/20/2017 3:30 PM Justus KWOK M.D. [facility] Radiation Therapy         NEW PATIENT VISIT PRE-VISIT PLANNING    1.  [employer] Patient is checked in Patient Demographics? YES    2.  Immunizations were updated in [facility] using WebIZ?: Yes       •  Web Iz Recommendations: HEPATITIS A  TDAP VARICELLA (Chicken Pox)     3.  Patient is due for the following Health Maintenance Topics:   Health Maintenance Due   Topic Date Due   • Annual Wellness Visit  1940   • IMM DTaP/Tdap/Td Vaccine (1 - Tdap) 01/22/1959   • COLONOSCOPY  01/22/1990   • IMM ZOSTER VACCINE  01/22/2000   • IMM PNEUMOCOCCAL 65+ (ADULT) LOW/MEDIUM RISK SERIES (1 of 2 - PCV13) 01/22/2005           4.  Reviewed/Updated the following with patient:       •   Preferred Pharmacy? YES       •   Preferred Lab? YES       •   Medications? YES. Was Abstract Encounter opened and chart updated? YES       •   Social History? YES. Was Abstract Encounter opened and chart updated? YES       •   Family History? YES. Was Abstract Encounter opened and chart updated? YES    5.  Updated Care Team?       •   DME Company (gait device, O2, CPAP, etc.) YES       •   Other Specialists (eye doctor, derm, GYN, cardiology, endo, etc): YES    6.  Patient was informed to arrive 15 min prior to their scheduled appointment and bring in their medication bottles? YES

## 2017-06-13 ENCOUNTER — APPOINTMENT (OUTPATIENT)
Dept: MEDICAL GROUP | Facility: MEDICAL CENTER | Age: 77
End: 2017-06-13
Payer: MEDICARE

## 2017-06-14 LAB — EKG IMPRESSION: NORMAL

## 2017-06-20 ENCOUNTER — OFFICE VISIT (OUTPATIENT)
Dept: MEDICAL GROUP | Facility: MEDICAL CENTER | Age: 77
End: 2017-06-20
Payer: MEDICARE

## 2017-06-20 VITALS
DIASTOLIC BLOOD PRESSURE: 59 MMHG | HEART RATE: 73 BPM | WEIGHT: 227 LBS | TEMPERATURE: 97.3 F | RESPIRATION RATE: 16 BRPM | SYSTOLIC BLOOD PRESSURE: 110 MMHG | BODY MASS INDEX: 34.4 KG/M2 | HEIGHT: 68 IN | OXYGEN SATURATION: 94 %

## 2017-06-20 DIAGNOSIS — C79.31 BRAIN METASTASIS: ICD-10-CM

## 2017-06-20 DIAGNOSIS — E78.5 DYSLIPIDEMIA: ICD-10-CM

## 2017-06-20 DIAGNOSIS — C34.90 MALIGNANT NEOPLASM OF LUNG, UNSPECIFIED LATERALITY, UNSPECIFIED PART OF LUNG (HCC): ICD-10-CM

## 2017-06-20 DIAGNOSIS — I10 ESSENTIAL HYPERTENSION: ICD-10-CM

## 2017-06-20 PROCEDURE — 99203 OFFICE O/P NEW LOW 30 MIN: CPT | Performed by: NURSE PRACTITIONER

## 2017-06-20 RX ORDER — ONDANSETRON HYDROCHLORIDE 8 MG/1
8 TABLET, FILM COATED ORAL PRN
COMMUNITY
Start: 2017-03-29

## 2017-06-20 ASSESSMENT — PAIN SCALES - GENERAL: PAINLEVEL: NO PAIN

## 2017-06-20 ASSESSMENT — PATIENT HEALTH QUESTIONNAIRE - PHQ9: CLINICAL INTERPRETATION OF PHQ2 SCORE: 3

## 2017-06-20 NOTE — PROGRESS NOTES
Subjective:      Sonny Carter is a 77 y.o. male who presents with New Patient            HPI Sonny Carter is here today accompanied by his wife to establish care. Patient has been living in [facility] [place] [facility] and recently moved to the area to be closer to his medical providers.      1. Malignant neoplasm of lung, unspecified laterality, unspecified part of lung (CMS-HCC)  Patient reports history of lung cancer with brain metastases for which she has had 2 previous episodes of radiation treatment with his last treatment a month ago. He believes he is doing well and follows with  in oncology. He follows on a regular basis and has his lab work done regularly. He reports he has not needed to see pulmonology. He believes his breathing is doing well and he does not need oxygen but does have an albuterol inhaler if necessary.    2. Brain metastasis (CMS-HCC)  Patient reports history of brain metastasis and is on Namenda through oncology.    3. Essential hypertension  Patient currently on metoprolol and reports prior history of MI in 2005. He states he has not needed to follow with cardiology. He reports he was taken off anticoagulation.    4. Dyslipidemia  Patient currently on simvastatin 20 mg which was previously prescribed by his PCP.    Social History   Substance Use Topics   • Smoking status: Former Smoker -- 2.50 packs/day for 53 years     Types: Cigarettes     Quit date: 01/20/2005   • Smokeless tobacco: Never Used      Comment: Started smoking at age 12   • Alcohol Use: No     Current Outpatient Prescriptions   Medication Sig Dispense Refill   • ondansetron (ZOFRAN) 8 MG Tab Take 8 mg by mouth as needed.     • simvastatin (ZOCOR) 20 MG Tab Take 20 mg by mouth every evening.     • albuterol 108 (90 BASE) MCG/ACT Aero Soln inhalation aerosol Inhale 2 Puffs by mouth every 4 hours. 8.5 g    • memantine (NAMENDA) 10 MG Tab Take 1 Tab by mouth 2 times a day. 60 Tab 5   • levothyroxine  "(SYNTHROID) 25 MCG Tab Take 25 mcg by mouth Every morning on an empty stomach.     • metoprolol (LOPRESSOR) 50 MG TABS Take 50 mg by mouth 2 times a day.       No current facility-administered medications for this visit.     Family History   Problem Relation Age of Onset   • Cancer Mother    • Other Father      old age     Past Medical History   Diagnosis Date   • Hypertension    • Weakness      right arm/leg   • Fall    • MI (myocardial infarction) (CMS-HCC) 2005     2 stents placed    • Unspecified cataract      bilateral IOL   • Bronchitis 2014   • High cholesterol    • Mass 3/2015     LL lung   • Cancer (CMS-HCC)      lung CA       Review of Systems   All other systems reviewed and are negative.         Objective:     /59 mmHg  Pulse 73  Temp(Src) 36.3 °C (97.3 °F)  Resp 16  Ht 1.727 m (5' 7.99\")  Wt 102.967 kg (227 lb)  BMI 34.52 kg/m2  SpO2 94%     Physical Exam   Constitutional: He is oriented to person, place, and time. He appears well-developed and well-nourished. No distress.   HENT:   Head: Normocephalic and atraumatic.   Right Ear: External ear normal.   Left Ear: External ear normal.   Nose: Nose normal.   Mouth/Throat: Oropharynx is clear and moist.   Eyes: Conjunctivae are normal. Right eye exhibits no discharge. Left eye exhibits no discharge.   Neck: Normal range of motion. Neck supple. No tracheal deviation present. No thyromegaly present.   Cardiovascular: Normal rate, regular rhythm and normal heart sounds.    No murmur heard.  Pulmonary/Chest: Effort normal and breath sounds normal. No respiratory distress. He has no wheezes. He has no rales.   Lymphadenopathy:     He has no cervical adenopathy.   Neurological: He is alert and oriented to person, place, and time. Coordination normal.   Skin: Skin is warm and dry. No rash noted. He is not diaphoretic. No erythema.   Psychiatric: He has a normal mood and affect. His behavior is normal. Judgment and thought content normal.   Nursing " note and vitals reviewed.              Assessment/Plan:     1. Malignant neoplasm of lung, unspecified laterality, unspecified part of lung (CMS-HCC)  Patient will continue to follow with his oncologist and we will try to get records today.    2. Brain metastasis (CMS-HCC)  Patient has been through radiation treatment and appears mentally competent in the office today. He has moved to the area to be closer to his oncologist.    3. Essential hypertension  Blood pressure appears well controlled on his metoprolol. He has prior history of MI and states he was taken off anticoagulation and he may have been on an ACE inhibitor in the past but his blood pressure was low when it was removed.  - LIPID PROFILE; Future  - COMP METABOLIC PANEL; Future  - TSH; Future    4. Dyslipidemia  I will check cholesterol to see if it is adequately controlled.  - LIPID PROFILE; Future

## 2017-06-20 NOTE — MR AVS SNAPSHOT
"        Sonny Carter   2017 11:00 AM   Office Visit   MRN: [id number]    Department:  [address] [place] [address]   Dept Phone:  [phone]    Description:  Male : 1940   Provider:  EPIFANIO Ramos           Reason for Visit     New Patient           Allergies as of 2017     No Known Allergies      You were diagnosed with     Malignant neoplasm of lung, unspecified laterality, unspecified part of lung (CMS-HCC)   [2390146]       Brain metastasis (CMS-HCC)   [154202]       Essential hypertension   [5315109]       Dyslipidemia   [570186]         Vital Signs     Blood Pressure Pulse Temperature Respirations Height Weight    110/59 mmHg 73 36.3 °C (97.3 °F) 16 1.727 m (5' 7.99\") 102.967 kg (227 lb)    Body Mass Index Oxygen Saturation Smoking Status             34.52 kg/m2 94% Former Smoker         Basic Information     Date Of Birth Sex Race Ethnicity Preferred Language    1940 Male White Non- English      Your appointments     2017 12:00 PM   MR HEAD 60 with [address] MRI 1   [facility] IMAGING - MRI - [address]    [address]  [facility] [id number] [phone]            2017  2:30 PM   Follow Up with Justus KWOK M.D.   [facility] Radiation Therapy (--)    [address] [facility] [address]   [phone]            Sep 20, 2017 11:20 AM   Established Patient with EPIFANIO Ramos   [facility] [address]    [address]  [address]  [facility] [address] [phone]           You will be receiving a confirmation call a few days before your appointment from our automated call confirmation system.              Problem List              ICD-10-CM Priority Class Noted - Resolved    CAD (coronary artery disease) I25.10   2015 - Present    Brain metastasis (CMS-HCC) C79.31   3/23/2017 - Present    Normocytic anemia D64.9   2017 - Present    Thrombocytopenia (CMS-HCC) D69.6   2017 - Present    Malignant neoplasm " of lung (CMS-[facility]) C34.90   6/20/2017 - Present    Essential hypertension I10   6/20/2017 - Present    Dyslipidemia E78.5   6/20/2017 - Present      Health Maintenance        Date Due Completion Dates    IMM DTaP/Tdap/Td Vaccine (1 - Tdap) 1/22/1959 ---    COLONOSCOPY 1/22/1990 ---    IMM ZOSTER VACCINE 1/22/2000 ---    IMM PNEUMOCOCCAL 65+ (ADULT) LOW/MEDIUM RISK SERIES (1 of 2 - PCV13) 1/22/2005 ---            Current Immunizations     No immunizations on file.      Below and/or attached are the medications your provider expects you to take. Review all of your home medications and newly ordered medications with your provider and/or pharmacist. Follow medication instructions as directed by your provider and/or pharmacist. Please keep your medication list with you and share with your provider. Update the information when medications are discontinued, doses are changed, or new medications (including over-the-counter products) are added; and carry medication information at all times in the event of emergency situations     Allergies:  No Known Allergies          Medications  Valid as of: June 20, 2017 - 11:27 AM    Generic Name Brand Name Tablet Size Instructions for use    Albuterol Sulfate (Aero Soln) albuterol 108 (90 BASE) MCG/ACT Inhale 2 Puffs by mouth every 4 hours.        Levothyroxine Sodium (Tab) SYNTHROID 25 MCG Take 25 mcg by mouth Every morning on an empty stomach.        Memantine HCl (Tab) NAMENDA 10 MG Take 1 Tab by mouth 2 times a day.        Metoprolol Tartrate (Tab) LOPRESSOR 50 MG Take 50 mg by mouth 2 times a day.        Ondansetron HCl (Tab) ZOFRAN 8 MG Take 8 mg by mouth as needed.        Simvastatin (Tab) ZOCOR 20 MG Take 20 mg by mouth every evening.        .                 Medicines prescribed today were sent to:     [facility] [address] [place] [address] - [address] [address]    Phone: [phone] Fax: [phone]    Open 24 Hours?: No         Medication refill instructions:       If your prescription bottle indicates you have medication refills left, it is not necessary to call your provider’s office. Please contact your pharmacy and they will refill your medication.    If your prescription bottle indicates you do not have any refills left, you may request refills at any time through one of the following ways: The online [employer] system (except Urgent Care), by calling your provider’s office, or by asking your pharmacy to contact your provider’s office with a refill request. Medication refills are processed only during regular business hours and may not be available until the next business day. Your provider may request additional information or to have a follow-up visit with you prior to refilling your medication.   *Please Note: Medication refills are assigned a new Rx number when refilled electronically. Your pharmacy may indicate that no refills were authorized even though a new prescription for the same medication is available at the pharmacy. Please request the medicine by name with the pharmacy before contacting your provider for a refill.        Your To Do List     Future Labs/Procedures Complete By Expires    COMP METABOLIC PANEL  As directed 6/21/2018    LIPID PROFILE  As directed 6/21/2018    TSH  As directed 6/21/2018         [employer] Access Code: [email or web address]  Expires: 7/20/2017 11:22 AM    [employer]  A secure, online tool to manage your health information     [employer]’s [employer]® is a secure, online tool that connects you to your personalized health information from the privacy of your home -- day or night - making it very easy for you to manage your healthcare. Once the activation process is completed, you can even access your medical information using the [employer] dusty, which is available for free in the [employer] store or Google Play store.     [employer] provides the following levels of access (as shown below):   My Chart Features    [facility] Doctor [facility]  Specialists [facility]  Urgent  Care Non-[facility]  Primary Care  Doctor   Email your healthcare team securely and privately 24/7 X X X    Manage appointments: schedule your next appointment; view details of past/upcoming appointments X      Request prescription refills. X      View recent personal medical records, including lab and immunizations X X X X   View health record, including health history, allergies, medications X X X X   Read reports about your outpatient visits, procedures, consult and ER notes X X X X   See your discharge summary, which is a recap of your hospital and/or ER visit that includes your diagnosis, lab results, and care plan. X X       How to register for "[employer]:  1. Go to  [email or web address].  2. Click on the Sign Up Now box, which takes you to the New Member Sign Up page. You will need to provide the following information:  a. Enter your "[employer] Access Code exactly as it appears at the top of this page. (You will not need to use this code after you’ve completed the sign-up process. If you do not sign up before the expiration date, you must request a new code.)   b. Enter your date of birth.   c. Enter your home email address.   d. Click Submit, and follow the next screen’s instructions.  3. Create a "[employer] ID. This will be your "[employer] login ID and cannot be changed, so think of one that is secure and easy to remember.  4. Create a "[employer] password. You can change your password at any time.  5. Enter your Password Reset Question and Answer. This can be used at a later time if you forget your password.   6. Enter your e-mail address. This allows you to receive e-mail notifications when new information is available in "[employer].  7. Click Sign Up. You can now view your health information.    For assistance activating your "[employer] account, call [phone]

## 2017-06-20 NOTE — Clinical Note
[facility]  THALIA RamosP.NShaji  [address] [facility] [address]  [place] [address]  Fax: [phone]   Authorization for Release/Disclosure of   Protected Health Information   Name: JORDYN GONZALES : 1940 SSN: [id number]   Address: [address] [place] [address] [address] [place] [address] Phone:    [phone] (home)    I authorize the entity listed below to release/disclose the PHI below to:   [employer]CLARISSE Ramos.MARCIA and EPIFANIO Ramos   Provider or Entity Name:     Address   City, State, Zip   Phone:      Fax:     Reason for request: continuity of care   Information to be released:    [  ] LAST COLONOSCOPY,  including any PATH REPORT and follow-up  [  ] LAST FIT/COLOGUARD RESULT [  ] LAST DEXA  [  ] LAST MAMMOGRAM  [  ] LAST PAP  [  ] LAST LABS [  ] RETINA EXAM REPORT  [  ] IMMUNIZATION RECORDS  [  ] Release all info      [  ] Check here and initial the line next to each item to release ALL health information INCLUDING  _____ Care and treatment for drug and / or alcohol abuse  _____ HIV testing, infection status, or AIDS  _____ Genetic Testing    DATES OF SERVICE OR TIME PERIOD TO BE DISCLOSED: _____________  I understand and acknowledge that:  * This Authorization may be revoked at any time by you in writing, except if your health information has already been used or disclosed.  * Your health information that will be used or disclosed as a result of you signing this authorization could be re-disclosed by the recipient. If this occurs, your re-disclosed health information may no longer be protected by State or Federal laws.  * You may refuse to sign this Authorization. Your refusal will not affect your ability to obtain treatment.  * This Authorization becomes effective upon signing and will  on (date) __________.      If no date is indicated, this Authorization will  one (1) year from the signature date.    Name: Jordyn Gonzales    Signature:   Date:     2017            PLEASE FAX REQUESTED RECORDS BACK TO: [phone]

## 2017-06-23 ENCOUNTER — HOSPITAL ENCOUNTER (OUTPATIENT)
Dept: RADIOLOGY | Facility: MEDICAL CENTER | Age: 77
End: 2017-06-23
Attending: RADIOLOGY
Payer: MEDICARE

## 2017-06-23 DIAGNOSIS — C79.31 BRAIN METASTASIS: ICD-10-CM

## 2017-06-23 PROCEDURE — 700117 HCHG RX CONTRAST REV CODE 255: Performed by: RADIOLOGY

## 2017-06-23 PROCEDURE — 70553 MRI BRAIN STEM W/O & W/DYE: CPT

## 2017-06-23 PROCEDURE — A9579 GAD-BASE MR CONTRAST NOS,1ML: HCPCS | Performed by: RADIOLOGY

## 2017-06-23 RX ADMIN — GADODIAMIDE 20 ML: 287 INJECTION INTRAVENOUS at 13:02

## 2017-06-27 ENCOUNTER — HOSPITAL ENCOUNTER (OUTPATIENT)
Dept: RADIATION ONCOLOGY | Facility: MEDICAL CENTER | Age: 77
End: 2017-06-30
Attending: RADIOLOGY
Payer: MEDICARE

## 2017-06-27 VITALS
HEART RATE: 77 BPM | WEIGHT: 223.5 LBS | TEMPERATURE: 98.2 F | BODY MASS INDEX: 33.99 KG/M2 | OXYGEN SATURATION: 95 % | SYSTOLIC BLOOD PRESSURE: 130 MMHG | DIASTOLIC BLOOD PRESSURE: 75 MMHG

## 2017-06-27 DIAGNOSIS — C79.31 BRAIN METASTASIS: ICD-10-CM

## 2017-07-19 ENCOUNTER — HOSPITAL ENCOUNTER (OUTPATIENT)
Dept: LAB | Facility: MEDICAL CENTER | Age: 77
End: 2017-07-19
Attending: INTERNAL MEDICINE
Payer: MEDICARE

## 2017-07-19 LAB
ALBUMIN SERPL BCP-MCNC: 3.8 G/DL (ref 3.2–4.9)
ALBUMIN/GLOB SERPL: 1.5 G/DL
ALP SERPL-CCNC: 73 U/L (ref 30–99)
ALT SERPL-CCNC: 11 U/L (ref 2–50)
ANION GAP SERPL CALC-SCNC: 9 MMOL/L (ref 0–11.9)
AST SERPL-CCNC: 16 U/L (ref 12–45)
BILIRUB SERPL-MCNC: 0.3 MG/DL (ref 0.1–1.5)
BUN SERPL-MCNC: 18 MG/DL (ref 8–22)
CALCIUM SERPL-MCNC: 9.5 MG/DL (ref 8.5–10.5)
CHLORIDE SERPL-SCNC: 112 MMOL/L (ref 96–112)
CO2 SERPL-SCNC: 24 MMOL/L (ref 20–33)
CREAT SERPL-MCNC: 0.94 MG/DL (ref 0.5–1.4)
GFR SERPL CREATININE-BSD FRML MDRD: >60 ML/MIN/1.73 M 2
GLOBULIN SER CALC-MCNC: 2.5 G/DL (ref 1.9–3.5)
GLUCOSE SERPL-MCNC: 95 MG/DL (ref 65–99)
POTASSIUM SERPL-SCNC: 5 MMOL/L (ref 3.6–5.5)
PROT SERPL-MCNC: 6.3 G/DL (ref 6–8.2)
SODIUM SERPL-SCNC: 145 MMOL/L (ref 135–145)

## 2017-07-19 PROCEDURE — 36415 COLL VENOUS BLD VENIPUNCTURE: CPT

## 2017-07-19 PROCEDURE — 80053 COMPREHEN METABOLIC PANEL: CPT

## 2017-07-25 ENCOUNTER — HOSPITAL ENCOUNTER (OUTPATIENT)
Dept: RADIOLOGY | Facility: MEDICAL CENTER | Age: 77
End: 2017-07-25
Attending: INTERNAL MEDICINE
Payer: MEDICARE

## 2017-07-25 DIAGNOSIS — C34.32 PRIMARY MALIGNANT NEOPLASM OF BRONCHUS OF LEFT LOWER LOBE (HCC): ICD-10-CM

## 2017-07-26 ENCOUNTER — HOSPITAL ENCOUNTER (OUTPATIENT)
Dept: RADIOLOGY | Facility: MEDICAL CENTER | Age: 77
End: 2017-07-26
Attending: INTERNAL MEDICINE
Payer: MEDICARE

## 2017-07-26 PROCEDURE — 71260 CT THORAX DX C+: CPT

## 2017-07-26 PROCEDURE — 700117 HCHG RX CONTRAST REV CODE 255: Performed by: INTERNAL MEDICINE

## 2017-07-26 RX ADMIN — IOHEXOL 100 ML: 350 INJECTION, SOLUTION INTRAVENOUS at 12:15

## 2017-08-24 ENCOUNTER — HOSPITAL ENCOUNTER (OUTPATIENT)
Dept: RADIOLOGY | Facility: MEDICAL CENTER | Age: 77
End: 2017-08-24
Attending: INTERNAL MEDICINE
Payer: MEDICARE

## 2017-08-24 DIAGNOSIS — C79.31 BRAIN METASTASIS: ICD-10-CM

## 2017-08-24 PROCEDURE — 700117 HCHG RX CONTRAST REV CODE 255: Performed by: INTERNAL MEDICINE

## 2017-08-24 PROCEDURE — A9579 GAD-BASE MR CONTRAST NOS,1ML: HCPCS | Performed by: INTERNAL MEDICINE

## 2017-08-24 PROCEDURE — 70553 MRI BRAIN STEM W/O & W/DYE: CPT

## 2017-08-24 RX ADMIN — GADODIAMIDE 20 ML: 287 INJECTION INTRAVENOUS at 13:52

## 2021-01-16 DIAGNOSIS — Z23 NEED FOR VACCINATION: ICD-10-CM
